# Patient Record
Sex: MALE | Race: WHITE | NOT HISPANIC OR LATINO | Employment: STUDENT | URBAN - METROPOLITAN AREA
[De-identification: names, ages, dates, MRNs, and addresses within clinical notes are randomized per-mention and may not be internally consistent; named-entity substitution may affect disease eponyms.]

---

## 2017-10-11 ENCOUNTER — CLINICAL SUPPORT (OUTPATIENT)
Dept: OCCUPATIONAL MEDICINE | Facility: CLINIC | Age: 22
End: 2017-10-11

## 2017-10-11 DIAGNOSIS — Z11.1 PPD SCREENING TEST: Primary | ICD-10-CM

## 2017-10-11 PROCEDURE — 86580 TB INTRADERMAL TEST: CPT | Mod: S$GLB,,,

## 2017-11-25 ENCOUNTER — IMMUNIZATION (OUTPATIENT)
Dept: URGENT CARE | Facility: CLINIC | Age: 22
End: 2017-11-25

## 2017-11-25 PROCEDURE — 90686 IIV4 VACC NO PRSV 0.5 ML IM: CPT | Mod: S$GLB,,, | Performed by: INTERNAL MEDICINE

## 2023-07-07 ENCOUNTER — OFFICE VISIT (OUTPATIENT)
Dept: URGENT CARE | Facility: CLINIC | Age: 28
End: 2023-07-07
Payer: MEDICAID

## 2023-07-07 VITALS
TEMPERATURE: 99 F | OXYGEN SATURATION: 99 % | RESPIRATION RATE: 20 BRPM | HEIGHT: 70 IN | WEIGHT: 262 LBS | HEART RATE: 98 BPM | BODY MASS INDEX: 37.51 KG/M2 | DIASTOLIC BLOOD PRESSURE: 98 MMHG | SYSTOLIC BLOOD PRESSURE: 170 MMHG

## 2023-07-07 DIAGNOSIS — H60.332 ACUTE SWIMMER'S EAR OF LEFT SIDE: ICD-10-CM

## 2023-07-07 DIAGNOSIS — H61.23 IMPACTED CERUMEN OF BOTH EARS: ICD-10-CM

## 2023-07-07 DIAGNOSIS — H92.03 OTALGIA, BILATERAL: Primary | ICD-10-CM

## 2023-07-07 PROCEDURE — 99203 PR OFFICE/OUTPT VISIT, NEW, LEVL III, 30-44 MIN: ICD-10-PCS | Mod: S$GLB,,, | Performed by: FAMILY MEDICINE

## 2023-07-07 PROCEDURE — 99203 OFFICE O/P NEW LOW 30 MIN: CPT | Mod: S$GLB,,, | Performed by: FAMILY MEDICINE

## 2023-07-07 RX ORDER — ATORVASTATIN CALCIUM 20 MG/1
1 TABLET, FILM COATED ORAL DAILY
COMMUNITY
Start: 2023-06-10 | End: 2023-07-10

## 2023-07-07 RX ORDER — AMLODIPINE BESYLATE 5 MG/1
5 TABLET ORAL
COMMUNITY
Start: 2023-06-09

## 2023-07-07 RX ORDER — NEOMYCIN SULFATE, POLYMYXIN B SULFATE, HYDROCORTISONE 3.5; 10000; 1 MG/ML; [USP'U]/ML; MG/ML
3 SOLUTION/ DROPS AURICULAR (OTIC) 3 TIMES DAILY
Qty: 10 ML | Refills: 0 | Status: SHIPPED | OUTPATIENT
Start: 2023-07-07 | End: 2023-07-17

## 2023-07-07 RX ORDER — FLUOXETINE HYDROCHLORIDE 20 MG/1
1 CAPSULE ORAL DAILY
COMMUNITY
Start: 2023-06-28

## 2023-07-07 NOTE — PROGRESS NOTES
"Subjective:      Patient ID: Ovidio Montero Jr. is a 27 y.o. male.    Vitals:  height is 5' 10" (1.778 m) and weight is 118.8 kg (262 lb). His temperature is 98.6 °F (37 °C). His blood pressure is 170/98 (abnormal) and his pulse is 98. His respiration is 20 and oxygen saturation is 99%.     Chief Complaint: Otalgia    27 year old male presents today with an earache, AU but more AD. Symptom started about a week ago. Treatments at home includes Neomycin ear drops and Augmentin with no relief  Right ear somewhat improved, left ear is worse  Minimal sinus congestion      Otalgia   There is pain in both ears. This is a new problem. The current episode started in the past 7 days. The problem has been gradually worsening. There has been no fever. The pain is at a severity of 7/10. Pertinent negatives include no abdominal pain, coughing, diarrhea, ear discharge, headaches, hearing loss, neck pain, rash, rhinorrhea, sore throat or vomiting. He has tried ear drops for the symptoms.     HENT:  Positive for ear pain. Negative for ear discharge, hearing loss and sore throat.    Neck: Negative for neck pain.   Respiratory:  Negative for cough.    Gastrointestinal:  Negative for abdominal pain, vomiting and diarrhea.   Skin:  Negative for rash.   Neurological:  Negative for headaches.    Objective:     Physical Exam   Constitutional: He is oriented to person, place, and time. He appears well-developed. He is cooperative.   HENT:   Head: Normocephalic and atraumatic.   Ears:   Right Ear: Hearing, external ear and ear canal normal.   Left Ear: Hearing, external ear and ear canal normal.      Comments: Tms not visible, wax in both ear canal  Left ear canal inflamed and swollen  Tender tragus    Nose: Nose normal. No mucosal edema or nasal deformity. No epistaxis. Right sinus exhibits no maxillary sinus tenderness and no frontal sinus tenderness. Left sinus exhibits no maxillary sinus tenderness and no frontal sinus tenderness. "   Mouth/Throat: Uvula is midline, oropharynx is clear and moist and mucous membranes are normal. Mucous membranes are moist. No trismus in the jaw. Normal dentition. No uvula swelling. No oropharyngeal exudate or posterior oropharyngeal erythema. Oropharynx is clear.   Eyes: Conjunctivae and lids are normal.   Neck: Trachea normal and phonation normal. Neck supple.   Cardiovascular: Normal rate, regular rhythm, normal heart sounds and normal pulses.   Pulmonary/Chest: Effort normal and breath sounds normal.   Abdominal: Normal appearance and bowel sounds are normal. Soft.   Musculoskeletal: Normal range of motion.         General: Normal range of motion.   Neurological: He is alert and oriented to person, place, and time. He exhibits normal muscle tone.   Skin: Skin is warm, dry and intact.   Psychiatric: His speech is normal and behavior is normal. Judgment and thought content normal.   Nursing note and vitals reviewed.    Assessment:     1. Otalgia, bilateral    2. Impacted cerumen of both ears        Plan:       Otalgia, bilateral    Impacted cerumen of both ears         Irrigated and cleansed right ear canal  Left ear canal partially disimpacted,

## 2024-04-17 ENCOUNTER — OFFICE VISIT (OUTPATIENT)
Dept: URGENT CARE | Facility: CLINIC | Age: 29
End: 2024-04-17
Payer: COMMERCIAL

## 2024-04-17 ENCOUNTER — OCCUPATIONAL HEALTH (OUTPATIENT)
Dept: URGENT CARE | Facility: CLINIC | Age: 29
End: 2024-04-17
Payer: COMMERCIAL

## 2024-04-17 VITALS
WEIGHT: 262 LBS | SYSTOLIC BLOOD PRESSURE: 148 MMHG | TEMPERATURE: 98 F | BODY MASS INDEX: 37.51 KG/M2 | HEIGHT: 70 IN | RESPIRATION RATE: 17 BRPM | OXYGEN SATURATION: 97 % | DIASTOLIC BLOOD PRESSURE: 87 MMHG | HEART RATE: 95 BPM

## 2024-04-17 DIAGNOSIS — Z02.6 ENCOUNTER RELATED TO WORKER'S COMPENSATION CLAIM: ICD-10-CM

## 2024-04-17 DIAGNOSIS — Z02.83 ENCOUNTER FOR DRUG SCREENING: Primary | ICD-10-CM

## 2024-04-17 DIAGNOSIS — S86.911A STRAIN OF RIGHT KNEE, INITIAL ENCOUNTER: Primary | ICD-10-CM

## 2024-04-17 LAB
CTP QC/QA: YES
POC 10 PANEL DRUG SCREEN: NEGATIVE

## 2024-04-17 PROCEDURE — 80305 DRUG TEST PRSMV DIR OPT OBS: CPT | Mod: S$GLB,,, | Performed by: SURGERY

## 2024-04-17 PROCEDURE — 73562 X-RAY EXAM OF KNEE 3: CPT | Mod: 50,FY,S$GLB, | Performed by: RADIOLOGY

## 2024-04-17 PROCEDURE — 99203 OFFICE O/P NEW LOW 30 MIN: CPT | Mod: S$GLB,,, | Performed by: SURGERY

## 2024-04-17 RX ORDER — DICLOFENAC SODIUM 10 MG/G
2 GEL TOPICAL 4 TIMES DAILY
Qty: 200 G | Refills: 2 | OUTPATIENT
Start: 2024-04-17 | End: 2024-04-17

## 2024-04-17 RX ORDER — NAPROXEN 500 MG/1
500 TABLET ORAL 2 TIMES DAILY
Qty: 60 TABLET | Refills: 0 | Status: SHIPPED | OUTPATIENT
Start: 2024-04-17 | End: 2024-05-17

## 2024-04-17 RX ORDER — DICLOFENAC SODIUM 10 MG/G
2 GEL TOPICAL 4 TIMES DAILY
Qty: 200 G | Refills: 2 | OUTPATIENT
Start: 2024-04-17 | End: 2024-04-17 | Stop reason: SDUPTHER

## 2024-04-17 RX ORDER — DICLOFENAC SODIUM 10 MG/G
2 GEL TOPICAL 4 TIMES DAILY
Qty: 200 G | Refills: 2 | Status: SHIPPED | OUTPATIENT
Start: 2024-04-17
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
## 2024-04-24 ENCOUNTER — TELEPHONE (OUTPATIENT)
Dept: URGENT CARE | Facility: CLINIC | Age: 29
End: 2024-04-24
Payer: MEDICAID

## 2024-04-24 NOTE — TELEPHONE ENCOUNTER
Called the patient in reference to his [facility] appointment that was scheduled for today 4/24/24, but not showing on his OVMR that's given to the patients after there visits and the patient did not answer the phone after multiple rings.  AFG

## 2024-05-12 ENCOUNTER — HOSPITAL ENCOUNTER (EMERGENCY)
Facility: HOSPITAL | Age: 29
Discharge: HOME | End: 2024-05-12
Attending: EMERGENCY MEDICINE
Payer: MEDICAID

## 2024-05-12 VITALS
OXYGEN SATURATION: 96 % | WEIGHT: 315 LBS | SYSTOLIC BLOOD PRESSURE: 165 MMHG | DIASTOLIC BLOOD PRESSURE: 84 MMHG | BODY MASS INDEX: 45.2 KG/M2 | TEMPERATURE: 98 F | RESPIRATION RATE: 18 BRPM | HEART RATE: 97 BPM

## 2024-05-12 DIAGNOSIS — R07.9 CHEST PAIN: ICD-10-CM

## 2024-05-12 DIAGNOSIS — R53.83 FATIGUE, UNSPECIFIED TYPE: ICD-10-CM

## 2024-05-12 DIAGNOSIS — R07.89 CHEST WALL PAIN: Primary | ICD-10-CM

## 2024-05-12 DIAGNOSIS — R63.1 POLYDIPSIA: ICD-10-CM

## 2024-05-12 LAB
ALBUMIN SERPL BCP-MCNC: 3.7 G/DL (ref 3.5–5.2)
ALP SERPL-CCNC: 49 U/L (ref 55–135)
ALT SERPL W/O P-5'-P-CCNC: 56 U/L (ref 10–44)
ANION GAP SERPL CALC-SCNC: 14 MMOL/L (ref 8–16)
AST SERPL-CCNC: 33 U/L (ref 10–40)
BACTERIA #/AREA URNS HPF: NORMAL /HPF
BASOPHILS # BLD AUTO: 0.03 K/UL (ref 0–0.2)
BASOPHILS NFR BLD: 0.4 % (ref 0–1.9)
BILIRUB SERPL-MCNC: 0.2 MG/DL (ref 0.1–1)
BILIRUB UR QL STRIP: NEGATIVE
BUN SERPL-MCNC: 17 MG/DL (ref 6–20)
CALCIUM SERPL-MCNC: 9.6 MG/DL (ref 8.7–10.5)
CHLORIDE SERPL-SCNC: 103 MMOL/L (ref 95–110)
CLARITY UR: CLEAR
CO2 SERPL-SCNC: 24 MMOL/L (ref 23–29)
COLOR UR: YELLOW
CREAT SERPL-MCNC: 0.8 MG/DL (ref 0.5–1.4)
DIFFERENTIAL METHOD BLD: ABNORMAL
EOSINOPHIL # BLD AUTO: 0.2 K/UL (ref 0–0.5)
EOSINOPHIL NFR BLD: 2.8 % (ref 0–8)
ERYTHROCYTE [DISTWIDTH] IN BLOOD BY AUTOMATED COUNT: 14.1 % (ref 11.5–14.5)
EST. GFR  (NO RACE VARIABLE): >60 ML/MIN/1.73 M^2
GLUCOSE SERPL-MCNC: 84 MG/DL (ref 70–110)
GLUCOSE UR QL STRIP: ABNORMAL
HCT VFR BLD AUTO: 43.2 % (ref 40–54)
HGB BLD-MCNC: 13.9 G/DL (ref 14–18)
HGB UR QL STRIP: ABNORMAL
HYALINE CASTS #/AREA URNS LPF: 0 /LPF
IMM GRANULOCYTES # BLD AUTO: 0.04 K/UL (ref 0–0.04)
IMM GRANULOCYTES NFR BLD AUTO: 0.5 % (ref 0–0.5)
KETONES UR QL STRIP: NEGATIVE
LEUKOCYTE ESTERASE UR QL STRIP: NEGATIVE
LYMPHOCYTES # BLD AUTO: 2.7 K/UL (ref 1–4.8)
LYMPHOCYTES NFR BLD: 32.3 % (ref 18–48)
MCH RBC QN AUTO: 27.4 PG (ref 27–31)
MCHC RBC AUTO-ENTMCNC: 32.2 G/DL (ref 32–36)
MCV RBC AUTO: 85 FL (ref 82–98)
MICROSCOPIC COMMENT: NORMAL
MONOCYTES # BLD AUTO: 0.8 K/UL (ref 0.3–1)
MONOCYTES NFR BLD: 9.2 % (ref 4–15)
NEUTROPHILS # BLD AUTO: 4.6 K/UL (ref 1.8–7.7)
NEUTROPHILS NFR BLD: 54.8 % (ref 38–73)
NITRITE UR QL STRIP: NEGATIVE
NRBC BLD-RTO: 0 /100 WBC
PH UR STRIP: 6 [PH] (ref 5–8)
PLATELET # BLD AUTO: 283 K/UL (ref 150–450)
PMV BLD AUTO: 10 FL (ref 9.2–12.9)
POCT GLUCOSE: 100 MG/DL (ref 70–110)
POTASSIUM SERPL-SCNC: 4.1 MMOL/L (ref 3.5–5.1)
PROT SERPL-MCNC: 7.5 G/DL (ref 6–8.4)
PROT UR QL STRIP: ABNORMAL
RBC # BLD AUTO: 5.08 M/UL (ref 4.6–6.2)
RBC #/AREA URNS HPF: 1 /HPF (ref 0–4)
SODIUM SERPL-SCNC: 141 MMOL/L (ref 136–145)
SP GR UR STRIP: 1.02 (ref 1–1.03)
TROPONIN I SERPL DL<=0.01 NG/ML-MCNC: <0.006 NG/ML (ref 0–0.03)
TSH SERPL DL<=0.005 MIU/L-ACNC: 1.77 UIU/ML (ref 0.4–4)
URN SPEC COLLECT METH UR: ABNORMAL
UROBILINOGEN UR STRIP-ACNC: NEGATIVE EU/DL
WBC # BLD AUTO: 8.3 K/UL (ref 3.9–12.7)
WBC #/AREA URNS HPF: 1 /HPF (ref 0–5)

## 2024-05-12 PROCEDURE — 94761 N-INVAS EAR/PLS OXIMETRY MLT: CPT

## 2024-05-12 PROCEDURE — 99284 EMERGENCY DEPT VISIT MOD MDM: CPT | Mod: 25

## 2024-05-12 PROCEDURE — 85025 COMPLETE CBC W/AUTO DIFF WBC: CPT | Performed by: NURSE PRACTITIONER

## 2024-05-12 PROCEDURE — 93010 ELECTROCARDIOGRAM REPORT: CPT | Mod: ,,, | Performed by: INTERNAL MEDICINE

## 2024-05-12 PROCEDURE — 84443 ASSAY THYROID STIM HORMONE: CPT

## 2024-05-12 PROCEDURE — 80053 COMPREHEN METABOLIC PANEL: CPT | Performed by: NURSE PRACTITIONER

## 2024-05-12 PROCEDURE — 84484 ASSAY OF TROPONIN QUANT: CPT

## 2024-05-12 PROCEDURE — 82962 GLUCOSE BLOOD TEST: CPT

## 2024-05-12 PROCEDURE — 81000 URINALYSIS NONAUTO W/SCOPE: CPT | Performed by: NURSE PRACTITIONER

## 2024-05-12 PROCEDURE — 93005 ELECTROCARDIOGRAM TRACING: CPT

## 2024-05-12 NOTE — FIRST PROVIDER EVALUATION
Emergency Department TeleTriage Encounter Note      CHIEF COMPLAINT    Chief Complaint   Patient presents with    Chest Pain     Fatigue that started Wednesday with increased appetite and thirst. Chest pain that started yesterday.       VITAL SIGNS   Initial Vitals [05/12/24 1541]   BP Pulse Resp Temp SpO2   (!) 176/98 108 18 97.8 °F (36.6 °C) 95 %      MAP       --            ALLERGIES    Review of patient's allergies indicates:   Allergen Reactions    Penicillins        PROVIDER TRIAGE NOTE  Verbal consent for the teletriage evaluation was given by the patient at the start of the evaluation.  All efforts will be made to maintain patient's privacy during the evaluation.      This is a teletriage evaluation of a 28 y.o. male presenting to the ED with c/o chest pain, fatigue, increase in thirst and appetite that started 5 days ago. Limited physical exam via telehealth: The patient is awake, alert, answering questions appropriately and is not in respiratory distress.  As the Teletriage provider, I performed an initial assessment and ordered appropriate labs and imaging studies, if any, to facilitate the patient's care once placed in the ED. Once a room is available, care and a full evaluation will be completed by an alternate ED provider.  Any additional orders and the final disposition will be determined by that provider.  All imaging and labs will not be followed-up by the Teletriage Team, including myself.          ORDERS  Labs Reviewed   POCT GLUCOSE   POCT GLUCOSE MONITORING CONTINUOUS       ED Orders (720h ago, onward)      Start Ordered     Status Ordering Provider    05/12/24 1556 05/12/24 1555  CBC auto differential  STAT         Ordered ZOHREH VILLEGAS    05/12/24 1556 05/12/24 1555  Comprehensive metabolic panel  STAT         Ordered ZOHREH VILLEGAS    05/12/24 1556 05/12/24 1555  X-Ray Chest PA And Lateral  1 time imaging         Ordered ZOHREH VILLEGAS    05/12/24 1556 05/12/24 1555  Urinalysis, Reflex to  Urine Culture Urine, Clean Catch  STAT         Ordered ZOHREH VILLEGAS    05/12/24 1555 05/12/24 1555  Saline lock IV  Once         Ordered ZOHREH VILLEGAS    05/12/24 1555 05/12/24 1555  Pulse Oximetry Continuous  Continuous         Ordered ZOHREH VILLEGAS    05/12/24 1555 05/12/24 1555  Cardiac Monitoring - Adult  Continuous        Comments: Notify Physician If:    Ordered ZOHREH VILLEGAS    05/12/24 1555 05/12/24 1555  EKG 12-lead  Once         Ordered ZOHREH VILLEGAS    05/12/24 1547 05/12/24 1547  POCT glucose  Once         Final result EMERGENCY, DEPT PHYSICIAN    05/12/24 1546 05/12/24 1545  POCT glucose  Once         Acknowledged TYLER NARANJO    05/12/24 1544 05/12/24 1543  EKG 12-lead (Chest Pain) Age >30  Once        Comments: If patient > 30 yrs.    Completed by BEKA SEQUEIRA on 5/12/2024 at  3:44 PM TYLER NARANJO              Virtual Visit Note: The provider triage portion of this emergency department evaluation and documentation was performed via [employer], a HIPAA-compliant telemedicine application, in concert with a tele-presenter in the room. A face to face patient evaluation with one of my colleagues will occur once the patient is placed in an emergency department room.      DISCLAIMER: This note was prepared with [email or web address] voice recognition transcription software. Garbled syntax, mangled pronouns, and other bizarre constructions may be attributed to that software system.

## 2024-05-13 LAB
OHS QRS DURATION: 100 MS
OHS QTC CALCULATION: 454 MS

## 2024-05-13 NOTE — ED PROVIDER NOTES
"Encounter Date: 5/12/2024       History     Chief Complaint   Patient presents with    Chest Pain     Fatigue that started Wednesday with increased appetite and thirst. Chest pain that started yesterday.     Patient is a 28-year-old male with ADHD, anxiety, hypertension, and hyperlipidemia who presents to emergency room for left-sided chest pain, fatigue, and increased thirst that onset 4 days ago.  Patient states that he has been feeling increasingly fatigued, sleeping more often than usual.  Yesterday he started to have intermittent left-sided chest pain that felt like a pressure sensation.  States that "if he presses on it, it feels a little better." No exacerbating factors.  Denies nausea, vomiting, abdominal pain, weakness, numbness, tingling, fever, cough, sore throat, congestion, headache, visual changes, or others at this time.  No treatment attempted prior to arrival.    The history is provided by the patient. No  was used.     Review of patient's allergies indicates:   Allergen Reactions    Penicillins      Past Medical History:   Diagnosis Date    Acquired acanthosis nigricans     ADHD (attention deficit hyperactivity disorder)     Dr. Champagne    Anxiety     HTN (hypertension)     Hyperlipidemia, mixed     Hypertension     Insulin resistance     Morbid obesity     Seasonal allergic rhinitis     Sleep disturbance     Dr. Champagne, counseling    Tic disorder     Vision abnormalities     Myopia & Astigmatism     Past Surgical History:   Procedure Laterality Date    CIRCUMCISION, PRIMARY       Family History   Problem Relation Name Age of Onset    Obesity Mother Ena     Anxiety disorder Mother Ena     Depression Father      Mental illness Father      Depression Paternal Aunt x1     Mental illness Paternal Aunt x1     Depression Maternal Grandmother      Bipolar disorder Maternal Grandmother      Leukemia Maternal Grandfather          Adult Leukemia - in Remission    Depression Paternal " Grandfather      Suicidality Paternal Grandfather      Early death Paternal Grandfather      Cancer Other Mat DZ     Depression Other Mat DZ     Bipolar disorder Other Mat DZ     Mental illness Other Mat DZ     Stroke Other Mat DZ     Hypertension Other Mat DZ     Depression Other Pat DZ     Bipolar disorder Other Pat DZ     Mental illness Other Pat DZ      Social History     Tobacco Use    Smoking status: Passive Smoke Exposure - Never Smoker    Smokeless tobacco: Never   Substance Use Topics    Alcohol use: No    Drug use: No     Review of Systems   Constitutional:  Positive for fatigue. Negative for chills, diaphoresis and fever.   HENT:  Negative for congestion, sore throat and trouble swallowing.    Respiratory:  Negative for cough and shortness of breath.    Cardiovascular:  Positive for chest pain (left-sided). Negative for palpitations.   Gastrointestinal:  Negative for abdominal pain, blood in stool, constipation, diarrhea, nausea and vomiting.   Endocrine: Positive for polydipsia and polyphagia.   Genitourinary:  Negative for difficulty urinating, dysuria, frequency and hematuria.   Musculoskeletal:  Negative for back pain and myalgias.   Skin:  Negative for rash and wound.   Neurological:  Negative for weakness, light-headedness, numbness and headaches.       Physical Exam     Initial Vitals [05/12/24 1541]   BP Pulse Resp Temp SpO2   (!) 176/98 108 18 97.8 °F (36.6 °C) 95 %      MAP       --         Physical Exam    Nursing note and vitals reviewed.  Constitutional: He appears well-developed. He is not diaphoretic. He is Obese . No distress.   Patient well-appearing.  Awake and alert.  No acute distress.  Maintaining airway appropriately.  Speaking in complete sentences.   HENT:   Head: Normocephalic and atraumatic.   Right Ear: External ear normal.   Left Ear: External ear normal.   Mouth/Throat: Oropharynx is clear and moist and mucous membranes are normal.   Eyes: Conjunctivae and EOM are normal.  Pupils are equal, round, and reactive to light. No scleral icterus.   Neck: Neck supple.   Normal range of motion.  Cardiovascular:  Normal rate, regular rhythm and normal heart sounds.     Exam reveals no friction rub.       No murmur heard.  Pulmonary/Chest: Breath sounds normal. No respiratory distress. He has no wheezes. He has no rhonchi. He has no rales. He exhibits no tenderness.   Abdominal: Abdomen is soft. Bowel sounds are normal. He exhibits no distension. There is no abdominal tenderness. There is no rebound and no guarding.   Musculoskeletal:         General: No tenderness or edema. Normal range of motion.      Cervical back: Normal range of motion and neck supple.     Neurological: He is alert and oriented to person, place, and time. He has normal strength.   Skin: Skin is warm and dry. No erythema.   Psychiatric: He has a normal mood and affect. His behavior is normal. Thought content normal.         ED Course   Procedures  Labs Reviewed   CBC W/ AUTO DIFFERENTIAL - Abnormal; Notable for the following components:       Result Value    Hemoglobin 13.9 (*)     All other components within normal limits   COMPREHENSIVE METABOLIC PANEL - Abnormal; Notable for the following components:    Alkaline Phosphatase 49 (*)     ALT 56 (*)     All other components within normal limits   URINALYSIS, REFLEX TO URINE CULTURE - Abnormal; Notable for the following components:    Protein, UA 3+ (*)     Glucose, UA 1+ (*)     Occult Blood UA Trace (*)     All other components within normal limits    Narrative:     Specimen Source->Urine   URINALYSIS MICROSCOPIC    Narrative:     Specimen Source->Urine   TROPONIN I   TSH   POCT GLUCOSE   POCT GLUCOSE MONITORING CONTINUOUS          Imaging Results              X-Ray Chest PA And Lateral (Final result)  Result time 05/12/24 16:49:32      Final result by Allan Garcia MD (05/12/24 16:49:32)                   Impression:      No acute intrathoracic  abnormality.      Electronically signed by: Allan Garcia  Date:    05/12/2024  Time:    16:49               Narrative:    EXAMINATION:  XR CHEST PA AND LATERAL    CLINICAL HISTORY:  Chest Pain;    TECHNIQUE:  PA and lateral views of the chest were performed.    COMPARISON:  None    FINDINGS:  The lungs are clear, with normal appearance of pulmonary vasculature and no pleural effusion or pneumothorax.    The cardiac silhouette is normal in size. The hilar and mediastinal contours are unremarkable.    Bones are intact.                                       Medications - No data to display  Medical Decision Making  Patient presents to emergency room for chest pain.  Blood pressure 176/98.  Vital signs otherwise stable.  Physical exam as stated above.    Differential Diagnosis includes, but is not limited to ACS/MI, PE, aortic dissection, pneumothorax, cardiac tamponade, pericarditis/myocarditis, pneumonia, infection/abscess, lung mass, trauma/fracture, costochondritis/pleurisy, MSK pain/contusion, GERD, biliary disease, pancreatitis, or anemia. O2 saturation greater than 95%.  No shortness of breath.  No risk factors that would suggest pulmonary embolism.  Chest x-ray without mediastinal widening.  Unlikely dissection.  Chest x-ray without signs of pneumothorax, pneumonia, or mass.  No cardiomegaly.  I do not suspect pericarditis/myocarditis.  Lab work relatively unremarkable.  No anemia or electrolyte abnormalities.  TSH within normal limits.  Glucose within normal limits.  EKG without ST elevations or arrhythmia.  Troponin within normal limits.  Clinical presentation and physical exam most suggestive of GERD versus MSK pain.  Polydipsia may be due to diabetes insipidus.  Referral placed to family medicine for additional workup and management.    I see no indication of an emergent process beyond that addressed during our encounter. Patient stable for discharge at this time. I have counseled the patient regarding follow  up with PCP and gave strict return precautions. I have discussed the final diagnosis and gave instructions regarding home medications. Patient verbalized understanding and is agreeable.     Problems Addressed:  Chest pain: acute illness or injury  Chest wall pain: acute illness or injury  Fatigue, unspecified type: acute illness or injury  Polydipsia: acute illness or injury    Amount and/or Complexity of Data Reviewed  External Data Reviewed: notes.     Details: Patient currently sees Dr. Bahena with [facility] for primary care.  Patient has ER visits for hypertensive crisis in 06/2023.  Was in ICU at that time.  Labs: ordered. Decision-making details documented in ED Course.  Radiology: ordered. Decision-making details documented in ED Course.  ECG/medicine tests: ordered. Decision-making details documented in ED Course.    Risk  Risk Details: Comorbidities taken into consideration during the patient's evaluation and treatment include ADHD, anxiety, hypertension, and hyperlipidemia.  Social determinants of health taken into consideration during development of our treatment plan include difficulty in obtaining follow-up, obtaining medications, health literacy, access to healthy options for preventative/conservative management, and/or support systems due to, but not limited to, transportation limitations, socioeconomic status, and environmental factors.                ED Course as of 05/12/24 2026   Sun May 12, 2024   1809 Urinalysis, Reflex to Urine Culture Urine, Clean Catch(!)  Urinalysis with trace blood.  1+ glucose.  No leukocytes. [BJ]   1809 Urinalysis Microscopic  Microscopic urinalysis without bacteria. [BJ]   1809 POCT glucose  Glucose of 100. [BJ]   1809 X-Ray Chest PA And Lateral  Independent interpretation of chest x-ray without effusion, consolidation, or pneumothorax.  Trachea midline.  Agree with radiology reading. [BJ]   1841 CBC auto differential(!)  CBC relatively unremarkable.  No increase in white  blood cells.  H/H stable.  Platelet count within normal limits. [BJ]   1911 Troponin I  Troponin within normal limits. [BJ]   1911 Comprehensive metabolic panel(!)  CMP relatively unremarkable.  Electrolytes within normal limits.  BUN and creatinine within normal limits.  Glucose within normal limits.  No anion gap.  ALP slightly decreased to 49 and ALT slightly increased to 56. [BJ]   1911 EKG 12-lead (Chest Pain) Age >30  Independent interpretation of EKG with sinus tachycardia at 111 beats per minute.  No ST elevations.  No inversions.  No obvious arrhythmias. [BJ]   1920 TSH  TSH within normal limits. [BJ]      ED Course User Index  [BJ] Brenna Frazier PA-C                           Clinical Impression:  Final diagnoses:  [R07.9] Chest pain  [R07.89] Chest wall pain (Primary)  [R53.83] Fatigue, unspecified type  [R63.1] Polydipsia          ED Disposition Condition    Discharge Stable          ED Prescriptions    None       Follow-up Information       Follow up With Specialties Details Why Contact Info Additional Information    Jose Pierre MD Internal Medicine   [address]  [address]  [place] [address]  [phone]       [facility] Family [facility]   [address], [address]  [place] [address] [phone] Please park in [address] or [address] and use [address]. Take Medical Office Bldg. elevators.            This note was partially created using [employer] Voice Recognition software. Typographical and content errors may occur with this process. While efforts are made to detect and correct such errors, in some cases errors will persist. For this reason, wording in this document should be considered in the proper context and not strictly verbatim.        Brenna Frazier PA-C  05/12/24 2026

## 2024-05-13 NOTE — DISCHARGE INSTRUCTIONS
Thank you for allowing me and my emergency team to take care of you here today! I hope you feel better soon. Please do not hesitate to return with any additional concerns that may arise from this or any new problem you encounter.    Our goal in the emergency department is to always give you outstanding care and exceptional service. If you receive a survey by mail or e-mail in the next week regarding your experience in our ED, we would greatly appreciate you completing it. Your feedback provides us with a way to recognize our staff who give very good care and it helps us learn how to improve when your experience was below the excellence we aspire to be!    Brook Juneau, PA-C [facility]   Emergency Room Physician Assistant

## 2024-05-13 NOTE — ED NOTES
Assumed care of patient. Patient resting comfortably in bed. Side rails up and call light within reach. Patient has no complaints at this time.

## 2024-06-24 ENCOUNTER — HOSPITAL ENCOUNTER (EMERGENCY)
Facility: HOSPITAL | Age: 29
Discharge: HOME | End: 2024-06-24
Attending: EMERGENCY MEDICINE
Payer: COMMERCIAL

## 2024-06-24 VITALS
RESPIRATION RATE: 79 BRPM | OXYGEN SATURATION: 99 % | HEART RATE: 99 BPM | HEIGHT: 70 IN | WEIGHT: 315 LBS | DIASTOLIC BLOOD PRESSURE: 98 MMHG | SYSTOLIC BLOOD PRESSURE: 207 MMHG | BODY MASS INDEX: 45.1 KG/M2 | TEMPERATURE: 98 F

## 2024-06-24 DIAGNOSIS — S99.919A ANKLE INJURY: ICD-10-CM

## 2024-06-24 DIAGNOSIS — S93.402A SPRAIN OF LEFT ANKLE, UNSPECIFIED LIGAMENT, INITIAL ENCOUNTER: Primary | ICD-10-CM

## 2024-06-24 PROCEDURE — 25000003 PHARM REV CODE 250: Performed by: NURSE PRACTITIONER

## 2024-06-24 PROCEDURE — 99283 EMERGENCY DEPT VISIT LOW MDM: CPT | Mod: 25

## 2024-06-24 RX ORDER — IBUPROFEN 800 MG/1
800 TABLET ORAL EVERY 6 HOURS PRN
Qty: 20 TABLET | Refills: 0 | Status: SHIPPED | OUTPATIENT
Start: 2024-06-24

## 2024-06-24 RX ORDER — IBUPROFEN 400 MG/1
800 TABLET ORAL
Status: COMPLETED | OUTPATIENT
Start: 2024-06-24 | End: 2024-06-24

## 2024-06-24 RX ORDER — AMLODIPINE BESYLATE 5 MG/1
5 TABLET ORAL
Status: COMPLETED | OUTPATIENT
Start: 2024-06-24 | End: 2024-06-24

## 2024-06-24 RX ADMIN — IBUPROFEN 800 MG: 400 TABLET ORAL at 09:06

## 2024-06-24 RX ADMIN — AMLODIPINE BESYLATE 5 MG: 5 TABLET ORAL at 09:06

## 2024-06-24 NOTE — Clinical Note
"Ovidio Parkon" Winston was seen and treated in our emergency department on 6/24/2024.  He may return to work on 06/26/2024.       If you have any questions or concerns, please don't hesitate to call.      Lia Sosa NP"

## 2024-06-24 NOTE — DISCHARGE INSTRUCTIONS

## 2024-06-25 ENCOUNTER — OFFICE VISIT (OUTPATIENT)
Dept: URGENT CARE | Facility: CLINIC | Age: 29
End: 2024-06-25
Payer: COMMERCIAL

## 2024-06-25 VITALS
HEART RATE: 98 BPM | DIASTOLIC BLOOD PRESSURE: 123 MMHG | HEIGHT: 70 IN | OXYGEN SATURATION: 97 % | BODY MASS INDEX: 45.1 KG/M2 | RESPIRATION RATE: 18 BRPM | SYSTOLIC BLOOD PRESSURE: 184 MMHG | WEIGHT: 315 LBS | TEMPERATURE: 98 F

## 2024-06-25 DIAGNOSIS — Z02.6 ENCOUNTER RELATED TO WORKER'S COMPENSATION CLAIM: ICD-10-CM

## 2024-06-25 DIAGNOSIS — S86.911D STRAIN OF RIGHT KNEE, SUBSEQUENT ENCOUNTER: Primary | ICD-10-CM

## 2024-06-25 PROCEDURE — 99213 OFFICE O/P EST LOW 20 MIN: CPT | Mod: S$GLB,,, | Performed by: SURGERY

## 2024-06-25 NOTE — ED PROVIDER NOTES
Encounter Date: 6/24/2024       History     Chief Complaint   Patient presents with    Ankle Pain     Left ankle injury a few days ago and currently with pain without relief from ibuprofen this AM.     28-year-old male presents emergency room with reports of left ankle pain that occurred on Friday while at work.  Patient states over the weekend he was able to stay off of the ankle in which it did not hurt until he came back to work today.  He reports pain with weight-bearing.  Reports pain is more to the lateral aspect of the ankle.  Patient has a past medical history of ADHD, anxiety, hypertension, hyperlipidemia, insulin resistance, morbid obesity, allergic rhinitis, sleep apnea, tic disorder, myopia.    The history is provided by the patient. No  was used.     Review of patient's allergies indicates:   Allergen Reactions    Penicillins      Past Medical History:   Diagnosis Date    Acquired acanthosis nigricans     ADHD (attention deficit hyperactivity disorder)     Dr. Champagne    Anxiety     HTN (hypertension)     Hyperlipidemia, mixed     Hypertension     Insulin resistance     Morbid obesity     Seasonal allergic rhinitis     Sleep disturbance     Dr. Champagne, counseling    Tic disorder     Vision abnormalities     Myopia & Astigmatism     Past Surgical History:   Procedure Laterality Date    CIRCUMCISION, PRIMARY       Family History   Problem Relation Name Age of Onset    Obesity Mother Ena     Anxiety disorder Mother Ena     Depression Father      Mental illness Father      Depression Paternal Aunt x1     Mental illness Paternal Aunt x1     Depression Maternal Grandmother      Bipolar disorder Maternal Grandmother      Leukemia Maternal Grandfather          Adult Leukemia - in Remission    Depression Paternal Grandfather      Suicidality Paternal Grandfather      Early death Paternal Grandfather      Cancer Other Mat DZ     Depression Other Mat DZ     Bipolar disorder Other Mat DZ      Mental illness Other Mat DZ     Stroke Other Mat DZ     Hypertension Other Mat DZ     Depression Other Pat DZ     Bipolar disorder Other Pat DZ     Mental illness Other Pat DZ      Social History     Tobacco Use    Smoking status: Passive Smoke Exposure - Never Smoker    Smokeless tobacco: Never   Substance Use Topics    Alcohol use: No    Drug use: No     Review of Systems   Musculoskeletal:         Ankle pain and injury   All other systems reviewed and are negative.      Physical Exam     Initial Vitals [06/24/24 0826]   BP Pulse Resp Temp SpO2   (!) 207/98 99 (!) 79 98.4 °F (36.9 °C) 99 %      MAP       --         Physical Exam    Constitutional: He appears well-developed and well-nourished. He is not diaphoretic. No distress.   HENT:   Head: Normocephalic and atraumatic.   Right Ear: Hearing and tympanic membrane normal.   Left Ear: Hearing and tympanic membrane normal.   Nose: Nose normal.   Mouth/Throat: Uvula is midline, oropharynx is clear and moist and mucous membranes are normal.   Eyes: Lids are normal. Pupils are equal, round, and reactive to light.   Cardiovascular:  Normal rate.           Pulmonary/Chest: Effort normal and breath sounds normal. No respiratory distress. He has no wheezes. He has no rhonchi.   Abdominal: Abdomen is soft. There is no abdominal tenderness.   Musculoskeletal:         General: Normal range of motion.      Cervical back: No rigidity.      Left ankle: Swelling present. No deformity or ecchymosis. No tenderness. Normal pulse.      Left Achilles Tendon: No defects. Luz's test negative.      Comments: There is no surrounding erythema nor break in the skin noted.  Positive distal pulses noted.      Swelling noted to the lateral aspect of the ankle.     Neurological: He is oriented to person, place, and time.   Skin: Skin is warm and dry. No rash noted.   Psychiatric: He has a normal mood and affect. His behavior is normal. Judgment and thought content normal.         ED  Course   Procedures  Labs Reviewed - No data to display       Imaging Results              X-Ray Ankle Complete Left (Final result)  Result time 06/24/24 09:04:39      Final result by Cosme Dougherty MD (06/24/24 09:04:39)                   Impression:      As above.      Electronically signed by: Cosme Dougherty  Date:    06/24/2024  Time:    09:04               Narrative:    EXAMINATION:  XR ANKLE COMPLETE 3 VIEW LEFT    CLINICAL HISTORY:  Unspecified injury of unspecified ankle, initial encounter    TECHNIQUE:  AP, lateral and oblique views of the left ankle were performed.    COMPARISON:  None    FINDINGS:  No acute fracture, dislocation, or osseous destruction.  No asymmetric mortise widening.  Talar dome is intact.  Probable os perineum on lateral view.  Calcaneal enthesophyte at the distal Achilles insertion.                                       Medications   amLODIPine tablet 5 mg (5 mg Oral Given 6/24/24 0915)   ibuprofen tablet 800 mg (800 mg Oral Given 6/24/24 0915)     Medical Decision Making  Differential Diagnosis includes, but is not limited to:  Fracture, dislocation, compartment syndrome, nerve injury/palsy, vascular injury, DVT, rhabdomyolysis, hemarthrosis, septic joint, cellulitis, bursitis, muscle strain, ligament tear/sprain, laceration, foreign body, abrasion, soft tissue contusion, osteoarthritis.       Amount and/or Complexity of Data Reviewed  Radiology: ordered.    Risk  Prescription drug management.               ED Course as of 06/24/24 2018 Mon Jun 24, 2024   0910 FINDINGS:  No acute fracture, dislocation, or osseous destruction.  No asymmetric mortise widening.  Talar dome is intact.  Probable os perineum on lateral view.  Calcaneal enthesophyte at the distal Achilles insertion.     Impression:     As above.   [DT]   0910 Independent interpretation of the chest x-ray does not identify any acute fracture.      Patient will be given crutches in addition to a walking boot and  referred to Podiatry.  He was given his dose of blood pressure medication here in the emergency room as he did not take his blood pressure medicine this morning.  Patient is nontoxic in appearance and is otherwise stable at this time for discharge.  Verbalized understanding of the need for follow-up with Podiatry. [DT]      ED Course User Index  [DT] Lia Sosa NP                           Clinical Impression:  Final diagnoses:  [S99.756V] Ankle injury  [S93.402A] Sprain of left ankle, unspecified ligament, initial encounter (Primary)          ED Disposition Condition    Discharge           ED Prescriptions       Medication Sig Dispense Start Date End Date Auth. Provider    ibuprofen (ADVIL,MOTRIN) 800 MG tablet Take 1 tablet (800 mg total) by mouth every 6 (six) hours as needed for Pain. 20 tablet 6/24/2024 -- Lia Sosa NP          Follow-up Information       Follow up With Specialties Details Why Contact Info    Ventura Swanson DPM Podiatry, Wound Care   [address]  [address]  [facility] [facility] [address]  [phone]               Lia Sosa NP  06/24/24 2018

## 2024-06-25 NOTE — LETTER
[facility] Occupational Health  [address] [address]  Phone: [phone]  Fax: [phone]  [employer]: [email or web address]    Pt Name: Ovidio Montero Jr.  Injury Date: 06/21/2024   Employee ID: [id number] Date of Treatment: 06/25/2024   Company: [employer]      Appointment Time: 10:00 AM Arrived: 9:20 AM    Provider: Nicole Jason MD Time Out:10:23 AM      Office Treatment:   1. Strain of right knee, subsequent encounter    2. Encounter related to worker's compensation claim               Restrictions: Regular Duty, Discharged from Occupational Health     Return As needed. EUGENIA

## 2024-06-25 NOTE — PROGRESS NOTES
Subjective:      Patient ID: Ovidio Montero Jr. is a 28 y.o. male.    Chief Complaint: Knee Injury (Right)    Patient's place of employment - [employer]  Patient's job title - Patient Transport   Date of injury - 4/10/24  Body part injured including left or right - Right Knee  Injury Mechanism - Repetitive motion   What they were doing when they got hurt - His department was short staffed and he was working alone with a more than normal work load. He states that by the end of the shift he was having knee pain.  What they did immediately after - Went home then reported it   Pain scale right now - 0/10    Patient does feel pain with prolonged walking still. Pain scale goes up to 4/10 with injury.    Knee Injury  This is a new problem. The current episode started more than 1 month ago. The problem occurs intermittently. The problem has been gradually improving. Associated symptoms include arthralgias and joint swelling. Pertinent negatives include no myalgias or numbness. The symptoms are aggravated by walking, exertion and bending. He has tried heat, ice, immobilization and acetaminophen (IBU) for the symptoms. The treatment provided moderate relief.       Musculoskeletal:  Positive for joint pain and joint swelling. Negative for muscle ache.   Neurological:  Negative for numbness.     See MA note above. Begin MD note:    Ovidio Montero Jr. is a 28 y.o. presenting for follow-up of right knee. He forgot to follow-up in [facility]. Today, he reports that he returns as he was reminded by Employee Health that his right knee injury claim remains open. He states the restrictions given in April were largely ignored. He has been able to work regular duty with use of ibuprofen once daily in the mornings. He also notes that staffing has improved. He has right knee pain with running around and when they are really busy.   He twisted his left ankle on Friday and had improvements over the weekend but had  increased pain with return to work on Monday so he presented to the ED and was given a boot.   He states that his BP is usually controlled with his medications but of late they haven't been working despite him using them. He is attempting to establish care with a new PCP.     Objective:     Physical Exam  Vitals and nursing note reviewed.   Constitutional:       General: He is not in acute distress.     Appearance: He is obese. He is not ill-appearing.   HENT:      Head: Normocephalic.   Eyes:      Conjunctiva/sclera: Conjunctivae normal.   Pulmonary:      Effort: Pulmonary effort is normal. No respiratory distress.   Musculoskeletal:         General: Tenderness present.      Right knee: Normal range of motion. Tenderness present over the patellar tendon.      Comments: Tib-Fib boot in place to left lower extremity, not wearing socks.   There is tenderness on palpation of anterior aspect of right knee. Right knee ROM is normal. He is able to rise from seated unassisted and without difficulty.   Skin:     General: Skin is warm.      Coloration: Skin is not pale.   Neurological:      General: No focal deficit present.      Mental Status: He is alert and oriented to person, place, and time.      GCS: GCS eye subscore is 4. GCS verbal subscore is 5. GCS motor subscore is 6.      Motor: Motor function is intact.      Coordination: Coordination is intact.   Psychiatric:         Attention and Perception: Attention normal.         Mood and Affect: Mood normal.         Speech: Speech normal.         Behavior: Behavior normal. Behavior is cooperative.         Thought Content: Thought content normal.            Assessment:      1. Strain of right knee, subsequent encounter    2. Encounter related to worker's compensation claim      Plan:     I advised that he contact  to address left ankle injury that he reports occurred at work. Advised that he try to see a podiatrist as recommended on his first visit in [facility]. From  the perspective of his right knee injury he is cleared to return to regular duty.  Discharge from [facility].         Diagnoses and plan discussed with the patient, as well as the expected course and duration of symptoms. Risks and benefits of any medication prescribed during this visit was explained, verbal instructions on use given. Clinic/Emergency department return precautions were given, can return to [facility] before scheduled follow-up appointment if notes worsening/aggravation of symptoms. All questions and concerns were addressed prior to discharge. Plan was developed with active input from the patient and they verbalized understanding of and agreement with the POC.  OE was informed of any referrals and relevant orders.  Note was dictated with voice recognition software, please excuse any grammatical errors.    I spent a total of 25 minutes on the day of the visit.  This includes face to face time and non-face to face time preparing to see the patient (e.g. review of medical record), obtaining and/or reviewing separately obtained history, documenting clinical information in the electronic or other health record, independently interpreting results and communicating results to the patient/family/caregiver, or care coordinator.     Restrictions: Regular Duty, Discharged from Occupational Health  Follow up if symptoms worsen or fail to improve.

## 2024-06-26 ENCOUNTER — OCCUPATIONAL HEALTH (OUTPATIENT)
Dept: URGENT CARE | Facility: CLINIC | Age: 29
End: 2024-06-26

## 2024-06-26 ENCOUNTER — OFFICE VISIT (OUTPATIENT)
Dept: URGENT CARE | Facility: CLINIC | Age: 29
End: 2024-06-26
Payer: COMMERCIAL

## 2024-06-26 VITALS
RESPIRATION RATE: 20 BRPM | SYSTOLIC BLOOD PRESSURE: 191 MMHG | WEIGHT: 315 LBS | BODY MASS INDEX: 46.65 KG/M2 | HEART RATE: 101 BPM | HEIGHT: 69 IN | TEMPERATURE: 99 F | DIASTOLIC BLOOD PRESSURE: 126 MMHG | OXYGEN SATURATION: 97 %

## 2024-06-26 DIAGNOSIS — S86.012A STRAIN OF ACHILLES TENDON, LEFT, INITIAL ENCOUNTER: ICD-10-CM

## 2024-06-26 DIAGNOSIS — S93.402A MODERATE LEFT ANKLE SPRAIN, INITIAL ENCOUNTER: ICD-10-CM

## 2024-06-26 DIAGNOSIS — Z02.83 ENCOUNTER FOR DRUG SCREENING: Primary | ICD-10-CM

## 2024-06-26 DIAGNOSIS — Z02.6 ENCOUNTER RELATED TO WORKER'S COMPENSATION CLAIM: Primary | ICD-10-CM

## 2024-06-26 LAB
CTP QC/QA: YES
POC 10 PANEL DRUG SCREEN: NEGATIVE

## 2024-06-26 PROCEDURE — 99203 OFFICE O/P NEW LOW 30 MIN: CPT | Mod: S$GLB,,, | Performed by: EMERGENCY MEDICINE

## 2024-06-26 RX ORDER — BUPROPION HYDROCHLORIDE 100 MG/1
100 TABLET, EXTENDED RELEASE ORAL EVERY MORNING
COMMUNITY

## 2024-06-26 RX ORDER — ONDANSETRON 4 MG/1
4 TABLET, ORALLY DISINTEGRATING ORAL EVERY 6 HOURS PRN
COMMUNITY
Start: 2024-03-12

## 2024-06-26 RX ORDER — ACETAMINOPHEN 500 MG
TABLET ORAL
COMMUNITY

## 2024-06-26 RX ORDER — LOSARTAN POTASSIUM 100 MG/1
200 TABLET ORAL
COMMUNITY

## 2024-06-26 NOTE — LETTER
[facility] - Occupational Health  [address] [address]  Phone: [phone]  Fax: [phone]  [facility]: [id number]    Pt Name: Ovidio Montero Jr.  Injury Date: 06/21/2024   Employee ID: [id number] Date of First Treatment: 06/26/2024   Company: [employer]      Appointment Time:  Arrived: 8:54 AM    Provider: Nick Garcia MD Time Out:10:35 AM      Office Treatment:   1. Encounter related to worker's compensation claim    2. Moderate left ankle sprain, initial encounter    3. Strain of Achilles tendon, left, initial encounter          Patient Instructions: Attention not to aggravate affected area, Elevated affected area, Use splint as directed      Restrictions: Sit or stand as needed, No Prolonged standing/walking (ALLOW TO WEAR BOOT)     Return Appointment: 7/1/2024 at 1:00 PM KATLYN

## 2024-06-26 NOTE — PROGRESS NOTES
Subjective:      Patient ID: Ovidio Montero Jr. is a 28 y.o. male.    Chief Complaint: Ankle Injury (LT)    Patient's place of employment - [employer]  Patient's job title - Patient Transporter  Date of injury - 06-21-24  Body part injured including left or right - LT Ankle  Injury Mechanism - Twist/Rolling  What they were doing when they got hurt - Moving a patient in their bed after a procedure and rolled his LT Ankle  What they did immediately after - Went to [facility] were x-rays were done and place in a walking boot  Pain scale right now - 3/10    PATIENT IS A 28-YEAR-OLD MALE WHO WAS RECENTLY CLEARED FOR RIGHT KNEE PAIN WHO STATES THAT ON JUNE 21ST 5 DAYS AGO HE HAD A SLIGHT INVERSION INJURY WITH RESULTANT LATERAL ANKLE DISCOMFORT AND PAIN WITH INVERSION AS WELL AS SOME ACHILLES INSERTION PAIN.  HE WAS SEEN AT URGENT CARE AND X-RAYS WERE PERFORMED AND WERE NEGATIVE FOR BONY INJURY.  HE WAS PLACED IN A WALKING BOOT AND REFERRED TO OCCUPATIONAL HEALTH.  THE RIGHT KNEE HAS CONTINUED TO BE NORMAL AND BASELINE AND IMPROVED AND THE LEFT ANKLE SHOWS NO SIGNIFICANT SWELLING WITH MILD TENDERNESS TO THE LATERAL LIGAMENTOUS COMPLEX AND ACHILLES TENDON DISTALLY.  NEGATIVE DESOUZA TEST AND AMBULATORY WITHOUT DIFFICULTY.  HE HAS HIS MEDICATION INCLUDING ANTI-INFLAMMATORY IBUPROFEN AND VOLTAREN GEL.  WILL INITIATE LIGHT DUTY RESTRICTIONS FOR 5 DAYS AND HE WILL RETURN ON MONDAY.  I ENCOURAGED HIM TO TRANSITION FROM BOOT TO SOCKS AND TENNIS SHOES AND EARLY RANGE OF MOTION EXERCISES AT HOME.  RETURN TO CLINIC 5 DAYS    Ankle Injury   Pertinent negatives include no numbness.     ROS  Constitution: Negative for chills and fever.   HENT:  Negative for postnasal drip, sinus pain and sore throat.    Neck: Negative for neck pain and neck stiffness.   Cardiovascular:  Negative for chest pain and palpitations.   Respiratory:  Negative for cough and shortness of breath.    Genitourinary:  Negative for dysuria and  hematuria.   Musculoskeletal:  Positive for pain, joint pain, joint swelling, abnormal ROM of joint, pain with walking and muscle ache. Negative for trauma and muscle cramps.   Skin:  Negative for wound, laceration, erythema and bruising.   Neurological:  Negative for altered mental status, numbness and tingling.   Psychiatric/Behavioral:  Negative for altered mental status.      Objective:     Physical Exam  Vitals and nursing note reviewed.   Constitutional:       General: He is not in acute distress.     Appearance: He is obese. He is not ill-appearing.   HENT:      Head: Normocephalic.   Eyes:      Conjunctiva/sclera: Conjunctivae normal.   Pulmonary:      Effort: Pulmonary effort is normal. No respiratory distress.   Musculoskeletal:         General: Tenderness present.      Right knee: Normal range of motion. Tenderness present over the patellar tendon.      Comments: Tib-Fib boot in place to left lower extremity, not wearing socks.   There is RESOLUTION tenderness on palpation of anterior aspect of right knee. Right knee ROM is normal. He is able to rise from seated unassisted and without difficulty.    EXAMINATION OF THE LEFT FOOT AND ANKLE SHOWS MILD TENDERNESS TO PALPATION OF THE LEFT ACHILLES INSERTION AND LATERAL LIGAMENTOUS COMPLEX.  DISTALLY NEUROVASCULARLY INTACT.  IS IN BOOT AND REMOVED.  NO BRUISING NO SIGNIFICANT SWELLING.  CONSISTENT WITH ACHILLES STRAIN AND LEFT ANKLE SPRAIN.  PAIN EXACERBATED WITH INVERSION   Skin:     General: Skin is warm.      Coloration: Skin is not pale.      Findings: No erythema.   Neurological:      General: No focal deficit present.      Mental Status: He is alert and oriented to person, place, and time.      GCS: GCS eye subscore is 4. GCS verbal subscore is 5. GCS motor subscore is 6.      Motor: Motor function is intact.      Coordination: Coordination is intact.   Psychiatric:         Attention and Perception: Attention normal.         Mood and Affect: Mood normal.          Speech: Speech normal.         Behavior: Behavior normal. Behavior is cooperative.         Thought Content: Thought content normal.        X-Ray Ankle Complete Left    Result Date: 6/24/2024  EXAMINATION: XR ANKLE COMPLETE 3 VIEW LEFT CLINICAL HISTORY: Unspecified injury of unspecified ankle, initial encounter TECHNIQUE: AP, lateral and oblique views of the left ankle were performed. COMPARISON: None FINDINGS: No acute fracture, dislocation, or osseous destruction.  No asymmetric mortise widening.  Talar dome is intact.  Probable os perineum on lateral view.  Calcaneal enthesophyte at the distal Achilles insertion.     As above. Electronically signed by: Cosme Dougherty Date:    06/24/2024 Time:    09:04       Assessment:      1. Encounter related to worker's compensation claim    2. Moderate left ankle sprain, initial encounter    3. Strain of Achilles tendon, left, initial encounter      Plan:     PATIENT IS A 28-YEAR-OLD MALE WHO WAS RECENTLY CLEARED FOR RIGHT KNEE PAIN WHO STATES THAT ON JUNE 21ST 5 DAYS AGO HE HAD A SLIGHT INVERSION INJURY WITH RESULTANT LATERAL ANKLE DISCOMFORT AND PAIN WITH INVERSION AS WELL AS SOME ACHILLES INSERTION PAIN.  HE WAS SEEN AT URGENT CARE AND X-RAYS WERE PERFORMED AND WERE NEGATIVE FOR BONY INJURY.  HE WAS PLACED IN A WALKING BOOT AND REFERRED TO OCCUPATIONAL HEALTH.  THE RIGHT KNEE HAS CONTINUED TO BE NORMAL AND BASELINE AND IMPROVED AND THE LEFT ANKLE SHOWS NO SIGNIFICANT SWELLING WITH MILD TENDERNESS TO THE LATERAL LIGAMENTOUS COMPLEX AND ACHILLES TENDON DISTALLY.  NEGATIVE DESOUZA TEST AND AMBULATORY WITHOUT DIFFICULTY.  HE HAS HIS MEDICATION INCLUDING ANTI-INFLAMMATORY IBUPROFEN AND VOLTAREN GEL.  WILL INITIATE LIGHT DUTY RESTRICTIONS FOR 5 DAYS AND HE WILL RETURN ON MONDAY.  I ENCOURAGED HIM TO TRANSITION FROM BOOT TO SOCKS AND TENNIS SHOES AND EARLY RANGE OF MOTION EXERCISES AT HOME.  RETURN TO CLINIC 5 DAYS     Patient Instructions: Attention not to aggravate  affected area, Elevated affected area, Use splint as directed   Restrictions: Sit or stand as needed, No Prolonged standing/walking (ALLOW TO WEAR BOOT)  Follow up in about 4 years (around 6/26/2028).

## 2024-07-01 ENCOUNTER — OFFICE VISIT (OUTPATIENT)
Dept: URGENT CARE | Facility: CLINIC | Age: 29
End: 2024-07-01
Payer: COMMERCIAL

## 2024-07-01 VITALS
HEIGHT: 69 IN | OXYGEN SATURATION: 98 % | HEART RATE: 98 BPM | BODY MASS INDEX: 46.65 KG/M2 | DIASTOLIC BLOOD PRESSURE: 92 MMHG | SYSTOLIC BLOOD PRESSURE: 154 MMHG | WEIGHT: 315 LBS | RESPIRATION RATE: 18 BRPM

## 2024-07-01 DIAGNOSIS — S93.402D MODERATE LEFT ANKLE SPRAIN, SUBSEQUENT ENCOUNTER: ICD-10-CM

## 2024-07-01 DIAGNOSIS — Z02.6 ENCOUNTER RELATED TO WORKER'S COMPENSATION CLAIM: Primary | ICD-10-CM

## 2024-07-01 DIAGNOSIS — S86.012D STRAIN OF LEFT ACHILLES TENDON, SUBSEQUENT ENCOUNTER: ICD-10-CM

## 2024-07-01 PROCEDURE — 99213 OFFICE O/P EST LOW 20 MIN: CPT | Mod: S$GLB,,, | Performed by: NURSE PRACTITIONER

## 2024-07-01 NOTE — LETTER
[facility]  [address] [address]  Phone: [phone]  Fax: [phone]  [facility]: [id number]    Pt Name: Ovidio Montero Jr.  Injury Date: 06/21/2024   Employee ID: [id number] Date of Treatment: 07/01/2024   Company: [employer]      Appointment Time: 12:45 PM Arrived: 12:43 pm   Provider: Miladys Bonilla NP Time Out:1:30 pm     Office Treatment:   1. Encounter related to worker's compensation claim    2. Moderate left ankle sprain, subsequent encounter    3. Strain of left Achilles tendon, subsequent encounter          Patient Instructions: Attention not to aggravate affected area, Daily home exercises/warm soaks, Use splint as directed (Wean out of boot. Elastic bandage and tennis shoe & sock.)        Restrictions: Regular Duty     Return Appointment: 7/8/2024 at 9:00 am    Virtual Visit (Please sign into [employer] before appointment time).    RAVEN

## 2024-07-01 NOTE — PROGRESS NOTES
"Subjective:      Patient ID: Ovidio Montero Jr. is a 28 y.o. male.    Chief Complaint: Ankle Pain    Patient's place of employment - [employer]  Patient's job title - Patient Transporter  Date of injury - 06-21-24  Body part injured - Left Ankle  Current work status per last visit - Regular Duty with Restrictions   Improved, same, or worse - Improving   Pain Scale right now (1-10) -  1/10 "only when I apply pressure to it"  SB.     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  He is here for follow-up for left ankle sprain.  He has feeling a lot better, having some pain to the lower Achilles area.  He wears the boot at work and does regular duty and tolerates well.  He has been weaning out of the boot at home.  Not wearing the boot presently.  He is wearing tennis shoes today.  He takes 800 mg of ibuprofen in the morning before work.  Reports he is feeling better than that last visit.  I reviewed the previous office visit notes, ED report and x-ray.  MWT        Constitution: Negative for chills and fever.   HENT:  Negative for postnasal drip, sinus pain and sore throat.    Neck: Negative for neck pain and neck stiffness.   Cardiovascular:  Negative for chest pain and palpitations.   Respiratory:  Negative for cough and shortness of breath.    Gastrointestinal:  Negative for abdominal pain, nausea and vomiting.   Genitourinary:  Negative for dysuria and hematuria.   Musculoskeletal:  Positive for pain. Negative for trauma, joint pain, joint swelling, abnormal ROM of joint, pain with walking, muscle cramps and muscle ache.   Skin:  Negative for wound, laceration, erythema and bruising.   Neurological:  Negative for altered mental status, numbness and tingling.   Psychiatric/Behavioral:  Negative for altered mental status.      Objective:     Physical Exam  Constitutional:       General: He is not in acute distress.     Appearance: Normal appearance. He is obese.   HENT:      Right Ear: External ear normal.      " Left Ear: External ear normal.   Eyes:      Conjunctiva/sclera: Conjunctivae normal.   Cardiovascular:      Pulses: Normal pulses.   Pulmonary:      Effort: Pulmonary effort is normal.   Musculoskeletal:         General: Tenderness present. No swelling.      Left ankle: No swelling, deformity or ecchymosis. Tenderness present. Normal range of motion. Anterior drawer test negative. Normal pulse.      Left Achilles Tendon: Tenderness present. No defects. Luz's test negative.        Legs:       Comments: Tender to palpation to lower Achilles left ankle.  He is ambulating well.  Does have pain with toe walking.  No significant swelling to left ankle.  Good range of motion.  Some pain with dorsiflexion and plantar flexion.  Neurovascular intact distally.   Skin:     General: Skin is warm and dry.      Capillary Refill: Capillary refill takes less than 2 seconds.      Findings: No bruising or erythema.   Neurological:      General: No focal deficit present.      Mental Status: He is alert and oriented to person, place, and time.   Psychiatric:         Mood and Affect: Mood normal.         Behavior: Behavior normal.         Thought Content: Thought content normal.         Judgment: Judgment normal.        Assessment:      1. Encounter related to worker's compensation claim    2. Moderate left ankle sprain, subsequent encounter    3. Strain of left Achilles tendon, subsequent encounter      Plan:   Patient is here for follow-up for left ankle sprain and left Achilles strain.  He is improving.  Continues to have pain to the Achilles region.  He is able to ambulate without the boot now.  Instructed to wean out of the boot at work and to try elastic bandage with tennis shoe and sock.  He is ibuprofen at home that he may take as needed for pain.  Reviewed warm soaks and range-of-motion stretches as well as towel exercises.    X-Ray Ankle Complete Left    Result Date: 6/24/2024  EXAMINATION: XR ANKLE COMPLETE 3 VIEW LEFT  CLINICAL HISTORY: Unspecified injury of unspecified ankle, initial encounter TECHNIQUE: AP, lateral and oblique views of the left ankle were performed. COMPARISON: None FINDINGS: No acute fracture, dislocation, or osseous destruction.  No asymmetric mortise widening.  Talar dome is intact.  Probable os perineum on lateral view.  Calcaneal enthesophyte at the distal Achilles insertion.     As above. Electronically signed by: Cosme Dougherty Date:    06/24/2024 Time:    09:04    I have reviewed the left ankle x-ray that was done in the emergency room which shows no acute fracture or dislocation.     Patient Instructions: Attention not to aggravate affected area, Daily home exercises/warm soaks, Use splint as directed (Wean out of boot. Elastic bandage and tennis shoe & sock.)   Restrictions: Regular Duty  Follow up in about 1 week (around 7/8/2024) for Virtual Visit.    I spent a total of 20 minutes on the day of the visit.This includes face to face time and non-face to face time preparing to see the patient (eg, review of tests), obtaining and/or reviewing separately obtained history, documenting clinical information in the electronic or other health record, independently interpreting results and communicating results to the patient/family/caregiver, or care coordinator.

## 2024-07-08 ENCOUNTER — OFFICE VISIT (OUTPATIENT)
Dept: URGENT CARE | Facility: CLINIC | Age: 29
End: 2024-07-08
Payer: COMMERCIAL

## 2024-07-08 VITALS
DIASTOLIC BLOOD PRESSURE: 103 MMHG | HEIGHT: 68 IN | BODY MASS INDEX: 47.74 KG/M2 | HEART RATE: 119 BPM | WEIGHT: 315 LBS | OXYGEN SATURATION: 97 % | SYSTOLIC BLOOD PRESSURE: 170 MMHG | RESPIRATION RATE: 20 BRPM | TEMPERATURE: 98 F

## 2024-07-08 DIAGNOSIS — S93.402D MODERATE LEFT ANKLE SPRAIN, SUBSEQUENT ENCOUNTER: ICD-10-CM

## 2024-07-08 DIAGNOSIS — Z02.6 ENCOUNTER RELATED TO WORKER'S COMPENSATION CLAIM: Primary | ICD-10-CM

## 2024-07-08 DIAGNOSIS — S86.012D STRAIN OF LEFT ACHILLES TENDON, SUBSEQUENT ENCOUNTER: ICD-10-CM

## 2024-07-08 PROCEDURE — 99213 OFFICE O/P EST LOW 20 MIN: CPT | Mod: S$GLB,,, | Performed by: NURSE PRACTITIONER

## 2024-07-08 NOTE — PROGRESS NOTES
Subjective:      Patient ID: Ovidio Montero Jr. is a 28 y.o. male.    Chief Complaint: Foot Injury    Patient's place of employment - [employer]  Patient's job title - Transport  Date of Injury - 6/21/24  Body part injured - left ankle / foot  Current work status per last visit - Regular duty  Improved, same, or worse - worse they wont let him work light duty  Pain Scale right now (1-10) -  0    Patient returned to regular duty and did not tolerate.  Having increased pain to the left heel.  The pain to the medial or lateral malleolus.  Ambulating with a limp.  He returns to the clinic today with the walker boot on.  Says it is much more comfortable with a walker boot.  Has been taking over-the-counter ibuprofen up to 600 mg.  Blood pressure elevated today.  He is upset today, emotional.  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  He never did do light duty but returned to work doing regular duty just after the injury.MWT    Foot Injury   Pertinent negatives include no numbness.       Constitution: Negative for chills and fever.   HENT:  Negative for postnasal drip, sinus pain and sore throat.    Neck: Negative for neck pain and neck stiffness.   Cardiovascular:  Negative for chest pain and palpitations.   Respiratory:  Negative for cough and shortness of breath.    Gastrointestinal:  Negative for abdominal pain, nausea and vomiting.   Genitourinary:  Negative for dysuria and hematuria.   Musculoskeletal:  Positive for pain, joint pain, joint swelling, abnormal ROM of joint and pain with walking. Negative for trauma, muscle cramps and muscle ache.   Skin:  Positive for erythema. Negative for wound, laceration and bruising.   Neurological:  Positive for tingling. Negative for altered mental status and numbness.   Psychiatric/Behavioral:  Negative for altered mental status.      Objective:     Physical Exam  Constitutional:       General: He is not in acute  distress.     Appearance: Normal appearance.   HENT:      Right Ear: External ear normal.      Left Ear: External ear normal.   Eyes:      Conjunctiva/sclera: Conjunctivae normal.   Cardiovascular:      Pulses: Normal pulses.   Pulmonary:      Effort: Pulmonary effort is normal.   Musculoskeletal:         General: Swelling and tenderness present. No deformity.      Left ankle: Swelling present. No deformity or ecchymosis. Tenderness present. No lateral malleolus, medial malleolus, ATF ligament or AITF ligament tenderness. Normal range of motion. Anterior drawer test negative. Normal pulse.      Left Achilles Tendon: Tenderness present. No defects. Luz's test negative.        Legs:       Comments: Tender to palpation over insertion point of left Achilles.  No defect.  Full range of motion.  Ambulates with limp.  Negative Luz test.   Skin:     Capillary Refill: Capillary refill takes less than 2 seconds.      Findings: Erythema present. No bruising.   Neurological:      General: No focal deficit present.      Mental Status: He is alert and oriented to person, place, and time.   Psychiatric:         Mood and Affect: Mood normal.         Behavior: Behavior normal.         Thought Content: Thought content normal.         Judgment: Judgment normal.        Assessment:      1. Encounter related to worker's compensation claim    2. Moderate left ankle sprain, subsequent encounter    3. Strain of left Achilles tendon, subsequent encounter      Plan:   Zack has experienced a setback.  He has wearing the walker boot once again.  He has been doing regular duty ever since the injury.  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  I told him to contact Employee Health regarding light duty restrictions.  He walks 9 or 10,000 steps within 4-5 hours.  I think he has been on his feet too much and therefore has experienced a setback.  Therefore, I have placed him back on light  duty.     His blood pressure is elevated today.  He is asymptomatic.  He does take blood pressure medication.  Instructed to follow-up with PCP.  Discussed emergency room precautions in the risks of uncontrolled high blood pressure.  I have not prescribed any NSAIDs due to elevated BP.  Cautioned patient about taking any NSAIDs due to elevated blood pressure.  He may take over-the-counter Tylenol or extra-strength Tylenol per label directions.  Also may take over-the-counter Voltaren gel.    Lace-up splint was given today.  He will attempt to wean out of the boot and into a regular tennis shoe using lace-up splint.    Patient wants to return to clinic on Monday, his day off.  Prefers a face-to-face visit rather than a virtual visit.         Patient Instructions: Attention not to aggravate affected area, Daily home exercises/warm soaks, Elevated affected area, Use splint as directed (May alternate ice and heat 10 min as desired.)   Restrictions: No Prolonged standing/walking, Sedentary work only, Sit or stand as needed (Allow to wear boot at work.)  Follow up in about 1 week (around 7/15/2024).    I spent a total of 30 minutes on the day of the visit.This includes face to face time and non-face to face time preparing to see the patient (eg, review of tests), obtaining and/or reviewing separately obtained history, documenting clinical information in the electronic or other health record, independently interpreting results and communicating results to the patient/family/caregiver, or care coordinator.

## 2024-07-08 NOTE — LETTER
[facility]  [address] [address]  Phone: [phone]  Fax: [phone]  [employer] [employer] [facility]: [id number]     Name: Ovidio Montero Jr.  Injury Date: 06/21/2024   Employee ID: [id number] Date of Treatment: 07/08/2024   Company: [employer]      Appointment Time: 08:45 AM Arrived: 8:46 am   Provider: Miladys Bonilla NP Time Out:10:00 am     Office Treatment:   1. Encounter related to worker's compensation claim    2. Moderate left ankle sprain, subsequent encounter    3. Strain of left Achilles tendon, subsequent encounter          Patient Instructions: Attention not to aggravate affected area, Daily home exercises/warm soaks, Elevated affected area, Use splint as directed (May alternate ice and heat 10 min as desired.)    Restrictions: No Prolonged standing/walking, Sedentary work only, Sit or stand as needed (Allow to wear boot at work.)     Return Appointment: 7/15/2024 at 8:30 am

## 2024-07-15 ENCOUNTER — OFFICE VISIT (OUTPATIENT)
Dept: URGENT CARE | Facility: CLINIC | Age: 29
End: 2024-07-15
Payer: COMMERCIAL

## 2024-07-15 VITALS
RESPIRATION RATE: 14 BRPM | WEIGHT: 315 LBS | HEIGHT: 68 IN | DIASTOLIC BLOOD PRESSURE: 90 MMHG | HEART RATE: 94 BPM | OXYGEN SATURATION: 98 % | BODY MASS INDEX: 47.74 KG/M2 | SYSTOLIC BLOOD PRESSURE: 152 MMHG

## 2024-07-15 DIAGNOSIS — Z02.6 ENCOUNTER RELATED TO WORKER'S COMPENSATION CLAIM: ICD-10-CM

## 2024-07-15 DIAGNOSIS — S93.402D MODERATE LEFT ANKLE SPRAIN, SUBSEQUENT ENCOUNTER: Primary | ICD-10-CM

## 2024-07-15 DIAGNOSIS — S86.012D STRAIN OF LEFT ACHILLES TENDON, SUBSEQUENT ENCOUNTER: ICD-10-CM

## 2024-07-15 PROCEDURE — 99214 OFFICE O/P EST MOD 30 MIN: CPT | Mod: S$GLB,,, | Performed by: SURGERY

## 2024-07-15 NOTE — PROGRESS NOTES
Subjective:      Patient ID: Ovidio Montero Jr. is a 28 y.o. male.    Chief Complaint: Foot Injury (LT)    Patient's place of employment - [employer]  Patient's job title - [employer]  Date of Injury - 06/21/2024  Body part injured - lt foot  Current work status per last visit -  No Prolonged standing/walking, Sedentary work only, Sit or stand as needed (Allow to wear boot at work.)  Improved, same, or worse - improved  Pain Scale right now (1-10) -  2/10    Foot Injury   Pertinent negatives include no numbness.       Musculoskeletal:  Positive for pain, trauma, joint pain, joint swelling, abnormal ROM of joint and pain with walking. Negative for muscle cramps and muscle ache.   Skin:  Negative for erythema.   Neurological:  Negative for numbness and tingling.     See MA note above. Begin MD note:    Ovidio Montero Jr. is a 28 y.o. presenting for follow-up of left ankle injury. He reports that he still has some pain. He soaked his foot/ankle this morning and that helped a bit. He notices the swelling most in the morning. He's been out of work as they don't have light duty available. He reports that he doesn't always do a good job advocating for himself when his supervisors don't abide by the restrictions he was given.    Objective:     Physical Exam  Constitutional:       General: He is not in acute distress.     Appearance: Normal appearance. He is morbidly obese.   HENT:      Right Ear: External ear normal.      Left Ear: External ear normal.   Eyes:      Conjunctiva/sclera: Conjunctivae normal.   Cardiovascular:      Pulses: Normal pulses.   Pulmonary:      Effort: Pulmonary effort is normal.   Musculoskeletal:         General: Swelling and tenderness present. No deformity.      Left ankle: Swelling present. No deformity or ecchymosis. Tenderness present. No lateral malleolus, medial malleolus, ATF ligament or AITF ligament tenderness. Normal range of motion. Anterior drawer test negative.  Normal pulse.      Left Achilles Tendon: Tenderness present. No defects. Luz's test negative.        Legs:       Comments: Tender to palpation over insertion point of left Achilles.  No defect.  Full range of motion. Pain with plantarflexion and eversion.  Ambulates with limp.   Skin:     Capillary Refill: Capillary refill takes less than 2 seconds.      Findings: No bruising or erythema.   Neurological:      General: No focal deficit present.      Mental Status: He is alert and oriented to person, place, and time.   Psychiatric:         Mood and Affect: Mood normal.         Behavior: Behavior normal.         Thought Content: Thought content normal.         Judgment: Judgment normal.          Assessment:      1. Moderate left ankle sprain, subsequent encounter    2. Encounter related to worker's compensation claim    3. Strain of left Achilles tendon, subsequent encounter      Plan:     I reviewed the clinic notes related to this injury. I counseled him about the importance of adhering to restrictions to enable active rehabilitation without further aggravation of his injury. He was encouraged to contact  and HR as needed to ensure that he is not working outside the restrictions. He is to wear the brace while working. I will send him PT ankle exercise handout so that he can continue working on strengthening his ankle. If no improvements noted, will refer to PT. Restrictions given. Follow-up virtually in 3 days to assess ability to trial return to regular duty.        Diagnoses and plan discussed with the patient, as well as the expected course and duration of symptoms. Risks and benefits of any medication prescribed during this visit was explained, verbal instructions on use given. Clinic/Emergency department return precautions were given, can return [facility] before scheduled follow-up appointment if notes worsening/aggravation of symptoms. All questions and concerns were addressed prior to discharge.  Plan was developed with active input from the patient and they verbalized understanding of and agreement with the POC.  [facility] was informed of any referrals and relevant orders.  Note was dictated with voice recognition software, please excuse any grammatical errors.    I spent a total of 30 minutes on the day of the visit.  This includes face to face time and non-face to face time preparing to see the patient (e.g. review of medical record), obtaining and/or reviewing separately obtained history, documenting clinical information in the electronic or other health record, independently interpreting results and communicating results to the patient/family/caregiver, or care coordinator.    Patient Instructions: Daily home exercises/warm soaks, Use splint as directed (Wear brace while at work)   Restrictions: No Prolonged standing/walking, Sit or stand as needed (Allow frequent rest breaks to prevent aggravation of ankle injury)  Follow up in about 3 days (around 7/18/2024) for Virtual Visit.

## 2024-07-15 NOTE — LETTER
[facility]  [address] [address]  Phone: [phone]  Fax: [phone]  [employer] [employer]: [id number]     Name: Ovidio Montero Jr.  Injury Date: 06/21/2024   Employee ID: [id number] Date of Treatment: 07/15/2024   Company: [employer]      Appointment Time: 08:30 AM Arrived: 8:30 AM   Provider: Nicole Jason MD Time Out:9:11 AM      Office Treatment:   1. Moderate left ankle sprain, subsequent encounter    2. Encounter related to worker's compensation claim    3. Strain of left Achilles tendon, subsequent encounter          Patient Instructions: Daily home exercises/warm soaks, Use splint as directed (Wear brace while at work)      Restrictions: No Prolonged standing/walking, Sit or stand as needed (Allow frequent rest breaks to prevent aggravation of ankle injury)     Return Appointment: 7/18/2024 at 10:00 AM [facility] [place]

## 2024-07-17 ENCOUNTER — PATIENT MESSAGE (OUTPATIENT)
Dept: URGENT CARE | Facility: CLINIC | Age: 29
End: 2024-07-17
Payer: COMMERCIAL

## 2024-07-18 ENCOUNTER — OFFICE VISIT (OUTPATIENT)
Dept: URGENT CARE | Facility: CLINIC | Age: 29
End: 2024-07-18
Payer: COMMERCIAL

## 2024-07-18 DIAGNOSIS — S93.402D MODERATE LEFT ANKLE SPRAIN, SUBSEQUENT ENCOUNTER: Primary | ICD-10-CM

## 2024-07-18 DIAGNOSIS — Z02.6 ENCOUNTER RELATED TO WORKER'S COMPENSATION CLAIM: ICD-10-CM

## 2024-07-18 DIAGNOSIS — S86.012D STRAIN OF LEFT ACHILLES TENDON, SUBSEQUENT ENCOUNTER: ICD-10-CM

## 2024-07-18 PROCEDURE — 99213 OFFICE O/P EST LOW 20 MIN: CPT | Mod: 95,,, | Performed by: SURGERY

## 2024-07-18 NOTE — PROGRESS NOTES
Subjective:      Patient ID: Ovidio Montero Jr. is a 28 y.o. male.    Chief Complaint: Foot Injury  The patient location is: [place]  The chief complaint leading to consultation is:   Chief Complaint   Patient presents with    Foot Injury       Visit type: audiovisual    Face to Face time with patient: 15  I spent a total of 25 minutes on the day of the visit.    minutes of total time spent on the encounter, which includes face to face time and non-face to face time preparing to see the patient (eg, review of tests), Obtaining and/or reviewing separately obtained history, Documenting clinical information in the electronic or other health record, Independently interpreting results (not separately reported) and communicating results to the patient/family/caregiver, or Care coordination (not separately reported).     Each patient to whom he or she provides medical services by telemedicine is:  (1) informed of the relationship between the physician and patient and the respective role of any other health care provider with respect to management of the patient; and (2) notified that he or she may decline to receive medical services by telemedicine and may withdraw from such care at any time.      Foot Injury       WONG Rollins MD note: He reports no improvements in pain symptoms. 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 He notes that this is the first time he has sprained something in his life. He is worried about recovery time and losing his job.   He received the exercises sent via [employer]. He has been able to do them without issue. He notes tightness at night when sleeping, flexing hurts a lot.     Objective:     Physical Exam  Vitals reviewed: Deferred due to telemedicine visit.   Constitutional:       General: He is not in acute distress.     Appearance: He is not ill-appearing.   HENT:      Head: Normocephalic.   Eyes:      Conjunctiva/sclera: Conjunctivae  normal.   Pulmonary:      Effort: Pulmonary effort is normal. No respiratory distress.   Musculoskeletal:      Left ankle: No swelling. Decreased range of motion.      Left Achilles Tendon: Tenderness present.      Comments: Pain with flexing   Skin:     General: Skin is warm.      Coloration: Skin is not pale.   Neurological:      General: No focal deficit present.      Mental Status: He is alert and oriented to person, place, and time.      GCS: GCS eye subscore is 4. GCS verbal subscore is 5. GCS motor subscore is 6.      Motor: Motor function is intact.      Coordination: Coordination is intact.   Psychiatric:         Attention and Perception: Attention normal.         Mood and Affect: Mood normal.         Speech: Speech normal.         Behavior: Behavior normal. Behavior is cooperative.         Thought Content: Thought content normal.        Assessment:      1. Moderate left ankle sprain, subsequent encounter    2. Encounter related to worker's compensation claim    3. Strain of left Achilles tendon, subsequent encounter      Plan:     Advised not to use the boot while sitting at home, use to ambulate. He would benefit from a course of PT, referral sent. He is to contact his WC adjustor regarding his questions about WC. I advised that initiation of PT can cause a transient increase in his pain symptoms, he should use the prescribed medications prior to or following PT to address this pain, it should improve as he progress through PT. Work restrictions are to be gradually reduced as he notes improvements with PT treatment. Follow-up after completion of first portion of PT treatment course.         Diagnoses and plan discussed with the patient, as well as the expected course and duration of symptoms. Risks and benefits of any medication prescribed during this visit was explained, verbal instructions on use given. Clinic/Emergency department return precautions were given, can return to [facility] before scheduled  follow-up appointment if notes worsening/aggravation of symptoms. All questions and concerns were addressed prior to discharge. Plan was developed with active input from the patient and they verbalized understanding of and agreement with the POC.  [facility] was informed of any referrals and relevant orders.  Note was dictated with voice recognition software, please excuse any grammatical errors.    I spent a total of 25 minutes on the day of the visit.  This includes face to face time and non-face to face time preparing to see the patient (e.g. review of medical record), obtaining and/or reviewing separately obtained history, documenting clinical information in the electronic or other health record, independently interpreting results and communicating results to the patient/family/caregiver, or care coordinator.    Patient Instructions: PT to be scheduled once authorized, Begin Physical Therapy   Restrictions: Sit or stand as needed, No Prolonged standing/walking, Sit down work only (Allow frequent rest breaks to prevent aggravation of ankle injury)  Follow up in about 1 month (around 8/20/2024).

## 2024-07-18 NOTE — LETTER
[facility]  [address] [address]  Phone: [phone]  Fax: [phone]  [employer] [employer] [facility]: [id number]    Pt Name: Ovidio Montero Jr.  Injury Date: 06/21/2024   Employee ID: [id number] Date of Treatment: 07/18/2024   Company: [employer]      Appointment Time: 10:30 AM Arrived: 10:30 AM    Provider: Nicole Jason MD Time Out:10:45 AM      Office Treatment:   1. Moderate left ankle sprain, subsequent encounter    2. Encounter related to worker's compensation claim    3. Strain of left Achilles tendon, subsequent encounter          Patient Instructions: PT to be scheduled once authorized, Begin Physical Therapy      Restrictions: Sit or stand as needed, No Prolonged standing/walking, Sit down work only (Allow frequent rest breaks to prevent aggravation of ankle injury)     Return Appointment: 8/20/2024 at 9:00 AM KATLYN

## 2024-08-02 ENCOUNTER — PATIENT MESSAGE (OUTPATIENT)
Dept: INTERNAL MEDICINE | Facility: CLINIC | Age: 29
End: 2024-08-02
Payer: COMMERCIAL

## 2024-08-07 ENCOUNTER — CLINICAL SUPPORT (OUTPATIENT)
Dept: REHABILITATION | Facility: HOSPITAL | Age: 29
End: 2024-08-07
Attending: SURGERY
Payer: COMMERCIAL

## 2024-08-07 DIAGNOSIS — M25.572 ACUTE LEFT ANKLE PAIN: Primary | ICD-10-CM

## 2024-08-07 DIAGNOSIS — M25.672 DECREASED RANGE OF MOTION OF LEFT ANKLE: ICD-10-CM

## 2024-08-07 DIAGNOSIS — R29.898 ANKLE WEAKNESS: ICD-10-CM

## 2024-08-07 PROCEDURE — 97140 MANUAL THERAPY 1/> REGIONS: CPT

## 2024-08-07 PROCEDURE — 97161 PT EVAL LOW COMPLEX 20 MIN: CPT

## 2024-08-16 ENCOUNTER — CLINICAL SUPPORT (OUTPATIENT)
Dept: REHABILITATION | Facility: HOSPITAL | Age: 29
End: 2024-08-16
Payer: COMMERCIAL

## 2024-08-16 DIAGNOSIS — M25.572 ACUTE LEFT ANKLE PAIN: Primary | ICD-10-CM

## 2024-08-16 DIAGNOSIS — R29.898 ANKLE WEAKNESS: ICD-10-CM

## 2024-08-16 DIAGNOSIS — M25.672 DECREASED RANGE OF MOTION OF LEFT ANKLE: ICD-10-CM

## 2024-08-16 PROCEDURE — 97110 THERAPEUTIC EXERCISES: CPT

## 2024-08-16 PROCEDURE — 97530 THERAPEUTIC ACTIVITIES: CPT

## 2024-08-16 PROCEDURE — 97112 NEUROMUSCULAR REEDUCATION: CPT

## 2024-08-16 NOTE — PROGRESS NOTES
[facility]   Physical Therapy Treatment Note      Name: Ovidio Montero Jr.  Clinic Number: [id number]    Therapy Diagnosis:   Encounter Diagnoses   Name Primary?    Acute left ankle pain Yes    Decreased range of motion of left ankle     Ankle weakness      Physician: Nicole Jason MD    Visit Date: 2024    Physician Orders: PT Eval and Treat   Medical Diagnosis from Referral:   Z02.6 (ICD-10-CM) - Encounter related to worker's compensation claim   S93.402D (ICD-10-CM) - Moderate left ankle sprain, subsequent encounter   S86.012D (ICD-10-CM) - Strain of left Achilles tendon, subsequent encounter   Evaluation Date: 2024  Authorization Period Expiration: 2025  Plan of Care Expiration: 10/7/2024  Visit # / Visits authorized:      Foto  Date  Score    #1/3  2024  47%   #2/3       #3/3          Time In: 6:15am  Time Out: 6:57am  Total Appointment Time (timed & untimed codes): 42 minutes    PTA Visit #: 0/5       Subjective     Patient reports: he is feeling pretty good today since its so early. He had some pain on the inside of his lower leg after walking a lot yesterday. He feels like he's definitely getting better but still has a lot of tightness and weakness in his ankle.  He  was not yet given  home exercise program.  Response to previous treatment: last session was IE  Functional change: none reported    Pain: 0/10  Location: left ankles     Objective      Objective Measures updated at progress report unless specified.     Treatment     Ovidio received the treatments listed below:      therapeutic exercises to develop strength, endurance, ROM, flexibility, posture, and core stabilization for 12 minutes includin way ankle 2x10 each RTB  Standing gastroc stretch x2'   Standing soleus stretch x2'      neuromuscular re-education activities to improve: Balance, Coordination, Proprioception, and Posture for 10 minutes. The following activities were  included:  SLS:   -  3x20s  Tandem stance on airex 2x20s B      therapeutic activities to improve functional performance for 20  minutes, including:  Calf raises 3x10  Tib push 3x10  Upright bike 6'  Shuttle DLP 3c 3x10      Patient Education and Home Exercises       Education provided:   - HEP    Written Home Exercises Provided: Yes. Exercises were reviewed and Ovidio was able to demonstrate them prior to the end of the session.  Ovidio demonstrated good  understanding of the education provided. See Electronic Medical Record under Patient Instructions for exercises provided during therapy sessions    Assessment     Pt tolerated first session after initial evaluation well today with no adverse effects. Session with emphasis on ankle strength and stability as well as gss tissue extensibility.     Ovidio Is progressing well towards his goals.   Patient prognosis: Good.   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     Plan of care discussed with patient: Yes  Patient's spiritual, cultural and educational needs considered and patient is agreeable to the plan of care and goals as stated below:      Anticipated Barriers for therapy: Time off of work    Goals:   Short Term Goals (3 Weeks):   1. Pt will be compliant with HEP to supplement PT in restoring pain free function.  2. Pt will improve impaired LE MMTs by 1/2 grade  to improve strength for functional tasks  3. Pt improve impaired ankle  ROM by 5-10 deg in all planes to improve mobility for normal movement patterns.   4. Pt will improve ankle swelling to equal circumference of R ankle to the L.      Long Term Goals (6 Weeks):  1. Pt will improve FOTO score to >/= 60 to decrease perceived limitation  with mobility  2. Pt will improve impaired LE MMTs by 1 grade to improve strength for functional tasks.  3. Pt improve impaired ankle ROM to WNL in all planes to improve mobility for normal movement patterns.   4. Pt will be able to walk without pain to return to leisure activities and work related tasks.     Plan     Plan of care Certification: 8/7/2024 to 10/7/2024.     Outpatient Physical Therapy 1-3 times weekly for 6 weeks to include the following interventions: Cervical/Lumbar Traction, Electrical Stimulation re-eval, dry needling, Gait Training, Iontophoresis (with ), Manual Therapy, Moist Heat/ Ice, Neuromuscular Re-ed, Patient Education, Self Care, Therapeutic Activities, and Therapeutic Exercise.     Andrew Castillo, PT

## 2024-08-20 ENCOUNTER — OFFICE VISIT (OUTPATIENT)
Dept: URGENT CARE | Facility: CLINIC | Age: 29
End: 2024-08-20
Payer: COMMERCIAL

## 2024-08-20 DIAGNOSIS — S93.402D MODERATE LEFT ANKLE SPRAIN, SUBSEQUENT ENCOUNTER: Primary | ICD-10-CM

## 2024-08-20 DIAGNOSIS — S86.012D STRAIN OF LEFT ACHILLES TENDON, SUBSEQUENT ENCOUNTER: ICD-10-CM

## 2024-08-20 DIAGNOSIS — Z02.6 ENCOUNTER RELATED TO WORKER'S COMPENSATION CLAIM: ICD-10-CM

## 2024-08-20 PROCEDURE — 99214 OFFICE O/P EST MOD 30 MIN: CPT | Mod: 95,,, | Performed by: SURGERY

## 2024-08-20 PROCEDURE — G2211 COMPLEX E/M VISIT ADD ON: HCPCS | Mod: 95,,, | Performed by: SURGERY

## 2024-08-20 NOTE — LETTER
[facility]  [address] [address]  Phone: [phone]  Fax: [phone]  [employer] [employer] [facility]: [id number]     Name: Ovidio Montero Jr.  Injury Date: 06/21/2024   Employee ID: [id number] Date of Treatment: 08/20/2024   Company: [employer]      Appointment Time: 09:00 AM Arrived: 9:00 AM    Provider: Nicole Jason MD Time Out:9:30 AM     Office Treatment:   1. Moderate left ankle sprain, subsequent encounter    2. Encounter related to worker's compensation claim    3. Strain of left Achilles tendon, subsequent encounter          Patient Instructions: Continue Physical Therapy      Restrictions: Sit or stand as needed, No Prolonged standing/walking     Return Appointment: 9/20/2024 at 9:00 AM Virtual

## 2024-08-20 NOTE — PROGRESS NOTES
Subjective:      Patient ID: Ovidio Montero Jr. is a 28 y.o. male.    Chief Complaint: No chief complaint on file.    The patient location is: [place]  The chief complaint leading to consultation is: No chief complaint on file.      Visit type: audiovisual    Face to Face time with patient: 10  I spent a total of 30 minutes on the day of the visit.    minutes of total time spent on the encounter, which includes face to face time and non-face to face time preparing to see the patient (eg, review of tests), Obtaining and/or reviewing separately obtained history, Documenting clinical information in the electronic or other health record, Independently interpreting results (not separately reported) and communicating results to the patient/family/caregiver, or Care coordination (not separately reported).     Each patient to whom he or she provides medical services by telemedicine is:  (1) informed of the relationship between the physician and patient and the respective role of any other health care provider with respect to management of the patient; and (2) notified that he or she may decline to receive medical services by telemedicine and may withdraw from such care at any time.    FOX Rollins MD note:  Weaned out of boot, is now using the brace. He still feels tightness at the back of ankle. 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     Objective:     Physical Exam  Vitals reviewed: Deferred due to telemedicine visit.   Constitutional:       General: He is not in acute distress.     Appearance: He is not ill-appearing.   HENT:      Head: Normocephalic.   Eyes:      Conjunctiva/sclera: Conjunctivae normal.   Pulmonary:      Effort: Pulmonary effort is normal. No respiratory distress.   Musculoskeletal:      Left ankle: No swelling. Decreased range of motion.      Left Achilles Tendon: Tenderness present.      Comments: Pain with flexing   Skin:     General: Skin is warm.      Coloration: Skin is  not pale.   Neurological:      General: No focal deficit present.      Mental Status: He is alert and oriented to person, place, and time.      GCS: GCS eye subscore is 4. GCS verbal subscore is 5. GCS motor subscore is 6.      Motor: Motor function is intact.      Coordination: Coordination is intact.   Psychiatric:         Attention and Perception: Attention normal.         Mood and Affect: Mood normal.         Speech: Speech normal.         Behavior: Behavior normal. Behavior is cooperative.         Thought Content: Thought content normal.        Assessment:      1. Moderate left ankle sprain, subsequent encounter    2. Encounter related to worker's compensation claim    3. Strain of left Achilles tendon, subsequent encounter      Plan:     I reviewed the PT notes related to this injury. He will continue PT, encouraged he remain consistent with treatment. Return to clinic in person for reassessment in 1 month, anticipate return to regular duty at that time. Okay to RTC sooner if feel able to return to regular duty prior.          Diagnoses and plan discussed with the patient, as well as the expected course and duration of symptoms. Risks and benefits of any medication prescribed during this visit was explained, verbal instructions on use given. Clinic/Emergency department return precautions were given, can return to [facility] before scheduled follow-up appointment if notes worsening/aggravation of symptoms. All questions and concerns were addressed prior to discharge. Plan was developed with active input from the patient and they verbalized understanding of and agreement with the POC.  C was informed of any referrals and relevant orders.  Note was dictated with voice recognition software, please excuse any grammatical errors.    I spent a total of 30 minutes on the day of the visit.  This includes face to face time and non-face to face time preparing to see the patient (e.g. review of medical record),  obtaining and/or reviewing separately obtained history, documenting clinical information in the electronic or other health record, independently interpreting results and communicating results to the patient/family/caregiver, or care coordinator.    Patient Instructions: Continue Physical Therapy   Restrictions: Sit or stand as needed, No Prolonged standing/walking  Follow up in about 1 month (around 9/20/2024).

## 2024-08-21 ENCOUNTER — PATIENT MESSAGE (OUTPATIENT)
Dept: URGENT CARE | Facility: CLINIC | Age: 29
End: 2024-08-21
Payer: COMMERCIAL

## 2024-08-22 ENCOUNTER — TELEPHONE (OUTPATIENT)
Dept: URGENT CARE | Facility: CLINIC | Age: 29
End: 2024-08-22
Payer: COMMERCIAL

## 2024-08-22 DIAGNOSIS — S93.402D MODERATE LEFT ANKLE SPRAIN, SUBSEQUENT ENCOUNTER: Primary | ICD-10-CM

## 2024-08-22 DIAGNOSIS — Z02.6 ENCOUNTER RELATED TO WORKER'S COMPENSATION CLAIM: ICD-10-CM

## 2024-08-22 DIAGNOSIS — S86.012D STRAIN OF LEFT ACHILLES TENDON, SUBSEQUENT ENCOUNTER: ICD-10-CM

## 2024-08-22 NOTE — LETTER
[facility]  [address] [address]  Phone: [phone]  Fax: [phone]  [facility] [facility]: [id number]    Pt Name: Ovidio Montero Jr. Injury Date:  6/21/2024   Employee ID: [id number] Date of Treatment: 08/20/2024   Company: [employer] Telephone Encounter: 8/22/2024     Appointment Time: 09:00 AM Arrived: 9:00 AM    Provider: Nicole Jason MD Time Out:9:30 AM     Office Treatment:   1. Moderate left ankle sprain, subsequent encounter    2. Encounter related to worker's compensation claim    3. Strain of left Achilles tendon, subsequent encounter             Patient Instructions: Continue Physical Therapy, Use splint as directed (Work with PT to wean off use of ankle brace)      Restrictions: Sit or stand as needed, No Prolonged standing/walking, Sit down work only (No prolonged walking greater than 30 yards at a time; allow use of ankle brace while working)              Return Appointment: 9/20/2024 at 9:00 AM at [facility]

## 2024-08-22 NOTE — TELEPHONE ENCOUNTER
Mr. Montero called due to issues with work restrictions. 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 He has been out of PT due to transportation issues but says he will have that issue resolved by tomorrow to be able to attend PT twice a week. I advised that he talk to PT and WC if he is having transportation issues as they may be able to provide assistance to ensure he consistently attends PT appointments.     [facility] is updated to include more specific restrictions and sit down work only. He will keep 9/20/24 follow-up appointment at [place].

## 2024-08-22 NOTE — LETTER
[facility]  [address] [address]  Phone: [phone]  Fax: [phone]  [facility] [facility]: [id number]    Pt Name: Ovidio Montero Jr.  Injury Date:     Employee ID: [id number] Date of Treatment: 08/20/2024   Company: [employer]      Appointment Time: 09:00 AM Arrived: 9:00 AM    Provider: Nicole Jason MD Time Out:9:30 AM     Office Treatment:   1. Moderate left ankle sprain, subsequent encounter    2. Encounter related to worker's compensation claim    3. Strain of left Achilles tendon, subsequent encounter                Patient Instructions: Continue Physical Therapy, Use splint as directed (Work with PT to wean off use of ankle brace)      Restrictions: Sit or stand as needed, No Prolonged standing/walking, Sit down work only (No prolonged walking greater than 30 yards at a time; allow use of ankle brace while working)            Return Appointment: 9/20/2024 at 9:00 AM at [facility]

## 2024-08-23 ENCOUNTER — CLINICAL SUPPORT (OUTPATIENT)
Dept: REHABILITATION | Facility: HOSPITAL | Age: 29
End: 2024-08-23
Payer: COMMERCIAL

## 2024-08-23 DIAGNOSIS — M25.672 DECREASED RANGE OF MOTION OF LEFT ANKLE: ICD-10-CM

## 2024-08-23 DIAGNOSIS — R29.898 ANKLE WEAKNESS: ICD-10-CM

## 2024-08-23 DIAGNOSIS — M25.572 ACUTE LEFT ANKLE PAIN: Primary | ICD-10-CM

## 2024-08-23 PROCEDURE — 97110 THERAPEUTIC EXERCISES: CPT

## 2024-08-23 PROCEDURE — 97112 NEUROMUSCULAR REEDUCATION: CPT

## 2024-08-23 PROCEDURE — 97530 THERAPEUTIC ACTIVITIES: CPT

## 2024-08-23 NOTE — PROGRESS NOTES
[facility]   Physical Therapy Treatment Note      Name: Ovidio Montero Jr.  Clinic Number: [id number]    Therapy Diagnosis:   Encounter Diagnoses   Name Primary?    Acute left ankle pain Yes    Decreased range of motion of left ankle     Ankle weakness        Physician: Nicole Jason MD    Visit Date: 2024    Physician Orders: PT Eval and Treat   Medical Diagnosis from Referral:   Z02.6 (ICD-10-CM) - Encounter related to worker's compensation claim   S93.402D (ICD-10-CM) - Moderate left ankle sprain, subsequent encounter   S86.012D (ICD-10-CM) - Strain of left Achilles tendon, subsequent encounter   Evaluation Date: 2024  Authorization Period Expiration: 2025  Plan of Care Expiration: 10/7/2024  Visit # / Visits authorized: 3/12     Foto  Date  Score    #1/3  2024  47%   #2/3       #3/3          Time In: 700  Time Out: 745  Total Appointment Time (timed & untimed codes): 45 minutes    PTA Visit #: 0/5       Subjective     Patient reports: he is feeling better overall. Patient is on light duty now and not walking as much as he was.   He was home exercise program.  Response to previous treatment: last session was IE  Functional change: none reported    Pain: 0/10  Location: left ankles     Objective      Objective Measures updated at progress report unless specified.     Treatment     Ovidio received the treatments listed below:      therapeutic exercises to develop strength, endurance, ROM, flexibility, posture, and core stabilization for 10 minutes includin way ankle 2x10 each RTB  Standing gastroc stretch x2'   Standing soleus stretch x2'      neuromuscular re-education activities to improve: Balance, Coordination, Proprioception, and Posture for 10 minutes. The following activities were included:    SLS:   -  3x20s  Tandem stance on airex 2x20s B      therapeutic activities to improve functional performance for 25  minutes, including:    Calf  raises 3x10  2 up 1 down calf raises 3x10   Tib push 3x10  Upright bike 6'  Shuttle DLP 6.5c 3x10  Shuttle double calf raises   - 5c 3x10       Patient Education and Home Exercises       Education provided:   - HEP    Written Home Exercises Provided: Yes. Exercises were reviewed and Ovidio was able to demonstrate them prior to the end of the session.  Ovidio demonstrated good  understanding of the education provided. See Electronic Medical Record under Patient Instructions for exercises provided during therapy sessions    Assessment     Patient continues to respond well to therapy session. Patient was progressed with TA today without adverse effects. Patient received additional HEP. Cont to progress as tolerated.         Ovidio Is progressing well towards his goals.   Patient prognosis: Good.   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     Plan of care discussed with patient: Yes  Patient's spiritual, cultural and educational needs considered and patient is agreeable to the plan of care and goals as stated below:      Anticipated Barriers for therapy: Time off of work    Goals:   Short Term Goals (3 Weeks):   1. Pt will be compliant with HEP to supplement PT in restoring pain free function.  2. Pt will improve impaired LE MMTs by 1/2 grade  to improve strength for functional tasks  3. Pt improve impaired ankle  ROM by 5-10 deg in all planes to improve mobility for normal movement patterns.   4. Pt will improve ankle swelling to equal circumference of R ankle to the L.      Long Term Goals (6 Weeks):  1. Pt will improve FOTO score to >/= 60 to decrease perceived limitation with mobility  2. Pt will improve impaired LE MMTs by 1 grade to improve  strength for functional tasks.  3. Pt improve impaired ankle ROM to WNL in all planes to improve mobility for normal movement patterns.   4. Pt will be able to walk without pain to return to leisure activities and work related tasks.     Plan     Plan of care Certification: 8/7/2024 to 10/7/2024.     Outpatient Physical Therapy 1-3 times weekly for 6 weeks to include the following interventions: Cervical/Lumbar Traction, Electrical Stimulation re-eval, dry needling, Gait Training, Iontophoresis (with ), Manual Therapy, Moist Heat/ Ice, Neuromuscular Re-ed, Patient Education, Self Care, Therapeutic Activities, and Therapeutic Exercise.     Ovidio Figueredo, PT

## 2024-08-30 ENCOUNTER — CLINICAL SUPPORT (OUTPATIENT)
Dept: REHABILITATION | Facility: HOSPITAL | Age: 29
End: 2024-08-30
Payer: COMMERCIAL

## 2024-08-30 DIAGNOSIS — R29.898 ANKLE WEAKNESS: ICD-10-CM

## 2024-08-30 DIAGNOSIS — M25.572 ACUTE LEFT ANKLE PAIN: Primary | ICD-10-CM

## 2024-08-30 DIAGNOSIS — M25.672 DECREASED RANGE OF MOTION OF LEFT ANKLE: ICD-10-CM

## 2024-08-30 PROCEDURE — 97530 THERAPEUTIC ACTIVITIES: CPT

## 2024-08-30 PROCEDURE — 97110 THERAPEUTIC EXERCISES: CPT

## 2024-08-30 NOTE — PROGRESS NOTES
[facility]   Physical Therapy Treatment Note      Name: Ovidio Montero Jr.  Clinic Number: [id number]    Therapy Diagnosis:   Encounter Diagnoses   Name Primary?    Acute left ankle pain Yes    Decreased range of motion of left ankle     Ankle weakness        Physician: Nicole Jason MD    Visit Date: 2024    Physician Orders: PT Eval and Treat   Medical Diagnosis from Referral:   Z02.6 (ICD-10-CM) - Encounter related to worker's compensation claim   S93.402D (ICD-10-CM) - Moderate left ankle sprain, subsequent encounter   S86.012D (ICD-10-CM) - Strain of left Achilles tendon, subsequent encounter   Evaluation Date: 2024  Authorization Period Expiration: 2025  Plan of Care Expiration: 10/7/2024  Visit # / Visits authorized:      Foto  Date  Score    #1/3  2024  47%   #2/3       #3/3          Time In: 700  Time Out: 732  Total Appointment Time (timed & untimed codes): 32 minutes    PTA Visit #: 0/5       Subjective     Patient reports: he is feeling better overall. Patient is on light duty now and not walking as much as he was. Patient has to leave early today due to work   He was home exercise program.  Response to previous treatment: last session was IE  Functional change: none reported    Pain: 0/10  Location: left ankles     Objective      Objective Measures updated at progress report unless specified.     Treatment     Ovidio received the treatments listed below:      therapeutic exercises to develop strength, endurance, ROM, flexibility, posture, and core stabilization for 8 minutes includin way ankle 2x10 each RTB  Standing gastroc stretch x2'   Standing soleus stretch x2'      neuromuscular re-education activities to improve: Balance, Coordination, Proprioception, and Posture for 0 minutes. The following activities were included:    SLS:   -  3x20s  Tandem stance on airex 2x20s B      therapeutic activities to improve functional  performance for 24  minutes, including:      Upright bike 6'  Calf raises 3x10    - 15x w/ 10lb weight in each hand  2 up 1 down calf raises 3x10   Tib push 3x10 NT   Shuttle DLP 6.5c 3x10  Shuttle SL press 3.5c 3x10  Shuttle double calf raises   - 5c 3x10       Patient Education and Home Exercises       Education provided:   - HEP    Written Home Exercises Provided: Yes. Exercises were reviewed and Ovidio was able to demonstrate them prior to the end of the session.  Ovidio demonstrated good  understanding of the education provided. See Electronic Medical Record under Patient Instructions for exercises provided during therapy sessions    Assessment     Patient continues to respond well to therapy session. Patient was progressed with TA today without adverse effects. Patient received additional HEP. Cont to progress as tolerated.         Ovidio Is progressing well towards his goals.   Patient prognosis: Good.   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     Plan of care discussed with patient: Yes  Patient's spiritual, cultural and educational needs considered and patient is agreeable to the plan of care and goals as stated below:      Anticipated Barriers for therapy: Time off of work    Goals:   Short Term Goals (3 Weeks):   1. Pt will be compliant with HEP to supplement PT in restoring pain free function.  2. Pt will improve impaired LE MMTs by 1/2 grade  to improve strength for functional tasks  3. Pt improve impaired ankle  ROM by 5-10 deg in all planes to improve mobility for normal movement patterns.   4. Pt will improve ankle swelling to equal circumference of R ankle to the L.      Long Term Goals (6 Weeks):  1. Pt will improve FOTO score  to >/= 60 to decrease perceived limitation with mobility  2. Pt will improve impaired LE MMTs by 1 grade to improve strength for functional tasks.  3. Pt improve impaired ankle ROM to WNL in all planes to improve mobility for normal movement patterns.   4. Pt will be able to walk without pain to return to leisure activities and work related tasks.     Plan     Plan of care Certification: 8/7/2024 to 10/7/2024.     Outpatient Physical Therapy 1-3 times weekly for 6 weeks to include the following interventions: Cervical/Lumbar Traction, Electrical Stimulation re-eval, dry needling, Gait Training, Iontophoresis (with ), Manual Therapy, Moist Heat/ Ice, Neuromuscular Re-ed, Patient Education, Self Care, Therapeutic Activities, and Therapeutic Exercise.     Ovidio Figueredo, PT

## 2024-09-27 ENCOUNTER — OFFICE VISIT (OUTPATIENT)
Dept: URGENT CARE | Facility: CLINIC | Age: 29
End: 2024-09-27
Payer: COMMERCIAL

## 2024-09-27 VITALS
WEIGHT: 315 LBS | HEART RATE: 102 BPM | SYSTOLIC BLOOD PRESSURE: 185 MMHG | HEIGHT: 68 IN | DIASTOLIC BLOOD PRESSURE: 129 MMHG | OXYGEN SATURATION: 97 % | BODY MASS INDEX: 47.74 KG/M2 | RESPIRATION RATE: 20 BRPM

## 2024-09-27 DIAGNOSIS — Z02.6 ENCOUNTER RELATED TO WORKER'S COMPENSATION CLAIM: ICD-10-CM

## 2024-09-27 DIAGNOSIS — S93.402D MODERATE LEFT ANKLE SPRAIN, SUBSEQUENT ENCOUNTER: Primary | ICD-10-CM

## 2024-09-27 DIAGNOSIS — S86.012D STRAIN OF LEFT ACHILLES TENDON, SUBSEQUENT ENCOUNTER: ICD-10-CM

## 2024-09-27 RX ORDER — SEMAGLUTIDE 1.34 MG/ML
1 INJECTION, SOLUTION SUBCUTANEOUS
COMMUNITY
Start: 2024-03-12

## 2024-09-27 NOTE — PROGRESS NOTES
Subjective:      Patient ID: Ovidio Montero Jr. is a 28 y.o. male.    Chief Complaint: Foot Injury (LT Foot/Ankle)    Patient's place of employment - [employer]  Patient's job title - [employer]  Date of Injury - 06-21-24  Body part injured - LT Foot/Ankle  Current work status per last visit - Sit or stand as needed, No Prolonged standing/walking  Improved, same, or worse - Improved  Pain Scale right now (1-10) -  0/10    Foot Injury   Pertinent negatives include no numbness.       Musculoskeletal:  Negative for pain, trauma, joint pain, joint swelling, abnormal ROM of joint, pain with walking, muscle cramps and muscle ache.   Skin:  Negative for erythema.   Neurological:  Negative for numbness and tingling.       See MA note above. Begin MD note:    Ovidio Montero Jr. is a 28 y.o. presenting for follow-up of left ankle injury. He has completed 3 PT sessions, does not feel that he needs to continue attending as he is pain free. He is not currently using any medications to address the pain.   He feels able to return to regular duty without restrictions.   He ask if they can do PT on his knee.     He says that he has appointment to establish care with a PCP on 10/8.    Objective:     Physical Exam  Constitutional:       General: He is not in acute distress.     Appearance: Normal appearance. He is morbidly obese.   HENT:      Right Ear: External ear normal.      Left Ear: External ear normal.   Eyes:      Conjunctiva/sclera: Conjunctivae normal.   Cardiovascular:      Pulses: Normal pulses.   Pulmonary:      Effort: Pulmonary effort is normal.   Musculoskeletal:         General: No swelling, tenderness or deformity.      Left ankle: No swelling, deformity or ecchymosis. No lateral malleolus, medial malleolus, ATF ligament or AITF ligament tenderness. Normal range of motion. Anterior drawer test negative. Normal pulse.      Left Achilles Tendon: No tenderness or defects. Luz's test  negative.      Comments: Wearing flip-flops. NTTP.  No defect.  Full range of motion. Normal gait.    Skin:     Capillary Refill: Capillary refill takes less than 2 seconds.      Findings: No bruising or erythema.   Neurological:      General: No focal deficit present.      Mental Status: He is alert and oriented to person, place, and time.   Psychiatric:         Mood and Affect: Mood normal.         Behavior: Behavior normal.         Thought Content: Thought content normal.         Judgment: Judgment normal.          Assessment:      1. Moderate left ankle sprain, subsequent encounter    2. Encounter related to worker's compensation claim    3. Strain of left Achilles tendon, subsequent encounter      Plan:     I reviewed the PT notes related to this injury, has not met any treatment goals. I advised that he continue PT to help strengthen and protect ankle from future injury. If he wants to be seen regarding the knee, he was instructed to reach out to his WC adjustor to inform them of his desire to seek care about his other claim. Regular duty.     Provided education on ED precautions for an elevated BP. Advised to remain consistent on any medication prescribed to address his BP.        Diagnoses and plan discussed with the patient, all questions and concerns were addressed prior to discharge. Follow-up as needed if any reoccurrence of symptoms related to the present condition. Plan was developed with active input from the patient and they verbalized understanding of and agreement with the POC.   Note was dictated with voice recognition software, please excuse any grammatical errors.    I spent a total of 30 minutes on the day of the visit.  This includes face to face time and non-face to face time preparing to see the patient (eg, review of tests), obtaining and/or reviewing separately obtained history, documenting clinical information in the electronic or other health record, independently interpreting results  and communicating results to the patient/family/caregiver, or care coordinator.    Patient Instructions: Daily home exercises/warm soaks, Continue Physical Therapy   Restrictions: Regular Duty  Follow up if symptoms worsen or fail to improve.

## 2024-09-27 NOTE — LETTER
[facility]  [address] [facility] [address]  Phone: [phone]  Fax: [phone]  [employer] [employer] [facility]: [id number]    Pt Name: Ovidio Montero Jr.  Injury Date: 06/21/2024   Employee ID: [id number] Date of First Treatment: 09/27/2024   Company: [employer]      Appointment Time: 01:15 PM Arrived: 1:14 pm   Provider: Nicole Jason MD Time Out:2:45 pm     Office Treatment:   1. Moderate left ankle sprain, subsequent encounter    2. Encounter related to worker's compensation claim    3. Strain of left Achilles tendon, subsequent encounter          Patient Instructions: Daily home exercises/warm soaks, Continue Physical Therapy    Restrictions: Regular Duty     Return Appointment: if symptoms worsen or fail to improve

## 2024-10-10 ENCOUNTER — HOSPITAL ENCOUNTER (EMERGENCY)
Facility: HOSPITAL | Age: 29
Discharge: HOME | End: 2024-10-10
Attending: EMERGENCY MEDICINE
Payer: MEDICAID

## 2024-10-10 VITALS
WEIGHT: 315 LBS | HEART RATE: 93 BPM | HEIGHT: 68 IN | RESPIRATION RATE: 20 BRPM | OXYGEN SATURATION: 98 % | DIASTOLIC BLOOD PRESSURE: 115 MMHG | BODY MASS INDEX: 47.74 KG/M2 | SYSTOLIC BLOOD PRESSURE: 182 MMHG | TEMPERATURE: 99 F

## 2024-10-10 DIAGNOSIS — I10 ASYMPTOMATIC HYPERTENSION: Primary | ICD-10-CM

## 2024-10-10 LAB
ALBUMIN SERPL BCP-MCNC: 4.1 G/DL (ref 3.5–5.2)
ALP SERPL-CCNC: 60 U/L (ref 55–135)
ALT SERPL W/O P-5'-P-CCNC: 50 U/L (ref 10–44)
ANION GAP SERPL CALC-SCNC: 13 MMOL/L (ref 8–16)
AST SERPL-CCNC: 44 U/L (ref 10–40)
BILIRUB SERPL-MCNC: 0.2 MG/DL (ref 0.1–1)
BUN SERPL-MCNC: 14 MG/DL (ref 6–20)
CALCIUM SERPL-MCNC: 10.1 MG/DL (ref 8.7–10.5)
CHLORIDE SERPL-SCNC: 104 MMOL/L (ref 95–110)
CO2 SERPL-SCNC: 24 MMOL/L (ref 23–29)
CREAT SERPL-MCNC: 0.9 MG/DL (ref 0.5–1.4)
EST. GFR  (NO RACE VARIABLE): >60 ML/MIN/1.73 M^2
GLUCOSE SERPL-MCNC: 94 MG/DL (ref 70–110)
POTASSIUM SERPL-SCNC: 3.8 MMOL/L (ref 3.5–5.1)
PROT SERPL-MCNC: 8.1 G/DL (ref 6–8.4)
SODIUM SERPL-SCNC: 141 MMOL/L (ref 136–145)

## 2024-10-10 PROCEDURE — 80053 COMPREHEN METABOLIC PANEL: CPT | Performed by: PHYSICIAN ASSISTANT

## 2024-10-10 PROCEDURE — 25000003 PHARM REV CODE 250: Performed by: EMERGENCY MEDICINE

## 2024-10-10 PROCEDURE — 99284 EMERGENCY DEPT VISIT MOD MDM: CPT

## 2024-10-10 RX ORDER — LOSARTAN POTASSIUM 100 MG/1
150 TABLET ORAL DAILY
Qty: 135 TABLET | Refills: 0 | Status: SHIPPED | OUTPATIENT
Start: 2024-10-10 | End: 2025-01-08

## 2024-10-10 RX ORDER — AMLODIPINE BESYLATE 5 MG/1
5 TABLET ORAL DAILY
Qty: 90 TABLET | Refills: 0 | Status: SHIPPED | OUTPATIENT
Start: 2024-10-10 | End: 2025-10-10

## 2024-10-10 RX ORDER — LOSARTAN POTASSIUM 50 MG/1
150 TABLET ORAL
Status: COMPLETED | OUTPATIENT
Start: 2024-10-10 | End: 2024-10-10

## 2024-10-10 RX ORDER — AMLODIPINE BESYLATE 5 MG/1
5 TABLET ORAL
Status: COMPLETED | OUTPATIENT
Start: 2024-10-10 | End: 2024-10-10

## 2024-10-10 RX ADMIN — LOSARTAN POTASSIUM 150 MG: 50 TABLET, FILM COATED ORAL at 07:10

## 2024-10-10 RX ADMIN — AMLODIPINE BESYLATE 5 MG: 5 TABLET ORAL at 07:10

## 2024-10-10 NOTE — Clinical Note
"Ovidio Parkscott Montero was seen and treated in our emergency department on 10/10/2024.  He may return to work on 10/14/2024.       If you have any questions or concerns, please don't hesitate to call.      Gretchen Castellanos PA-C"

## 2024-10-10 NOTE — ED TRIAGE NOTES
Pt c/o hypertension. States he can't get in with a PCP so he is out of his medications for about 3 months. Losartan and amlodipine. States otherwise feeling well.

## 2024-10-11 NOTE — DISCHARGE INSTRUCTIONS
It was nice meeting you, and I hope you feel better soon. You may return to the ER at any time for any new or concerning symptoms, worsening condition, or failure to improve.    Our goal in the ER is to always give you outstanding care and exceptional service. You may receive a survey by mail or email in the next week about your experience in our ED. We would greatly appreciate you completing and returning the survey. Your feedback provides us with a way to recognize our staff who give very good care and it helps us learn how to improve when your experience was below our aspiration of excellence.     Sincerely,     Gretchen Castellanos PA-C  Emergency Room Physician Assistant  [facility] [employer]

## 2024-10-12 NOTE — ED PROVIDER NOTES
Encounter Date: 10/10/2024       History     Chief Complaint   Patient presents with    Hypertension     [employer] employee, presents to triage c/o HTN, has not been taking prescribed medication bc he hasn't been able to get in with PCP, denies CP/HA/dizziness, +diaphoretic.      Ovidio Montero Jr. is a 28 y.o. male who has a past medical history of prediabetes, hypertension, hyperlipidemia, insulin resistance presenting to the Emergency Department for hypertension.  Patient reports he has been out of his medications for about 3 months he has been lost follow up.  He does sometimes feel lightheaded when his pressure is elevated.  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  Reports he averages 17,000 steps a day.  He does sweat heavily with exertion, sometimes gets short winded but improves with rest. Denies these symptoms at this time, though was reportedly diaphoretic in triage. No leg swelling. Never any chest pain. No headache, vision changes, dizziness, lightheadedness.  He has tried to establish care with Dr. Simpson however appointments are scheduled months out in advance at this point. Reports he otherwise feels well and is without complaint.       The history is provided by the patient.     Review of patient's allergies indicates:   Allergen Reactions    Penicillins      Past Medical History:   Diagnosis Date    Acquired acanthosis nigricans     ADHD (attention deficit hyperactivity disorder)     Dr. Champagne    Anxiety     Diabetes mellitus     HTN (hypertension)     Hyperlipidemia, mixed     Hypertension     Insulin resistance     Morbid obesity     Seasonal allergic rhinitis     Sleep disturbance     Dr. Champagne, counseling    Tic disorder     Vision abnormalities     Myopia & Astigmatism     Past Surgical History:   Procedure Laterality Date    CIRCUMCISION, PRIMARY       Family History   Problem Relation Name Age of Onset    Obesity Mother Ena     Anxiety disorder Mother Ena      Depression Father      Mental illness Father      Depression Paternal Aunt x1     Mental illness Paternal Aunt x1     Depression Maternal Grandmother      Bipolar disorder Maternal Grandmother      Leukemia Maternal Grandfather          Adult Leukemia - in Remission    Depression Paternal Grandfather      Suicidality Paternal Grandfather      Early death Paternal Grandfather      Cancer Other Mat DZ     Depression Other Mat DZ     Bipolar disorder Other Mat DZ     Mental illness Other Mat DZ     Stroke Other Mat DZ     Hypertension Other Mat DZ     Depression Other Pat DZ     Bipolar disorder Other Pat DZ     Mental illness Other Pat DZ      Social History     Tobacco Use    Smoking status: Never     Passive exposure: Yes    Smokeless tobacco: Never   Substance Use Topics    Alcohol use: No    Drug use: No     Review of Systems   Constitutional:  Positive for diaphoresis. Negative for chills and fever.   Eyes:  Negative for visual disturbance.   Respiratory:  Negative for shortness of breath.    Cardiovascular:  Negative for chest pain.   Gastrointestinal:  Negative for abdominal pain, diarrhea, nausea and vomiting.   Genitourinary:  Negative for dysuria.   Neurological:  Negative for dizziness, light-headedness and headaches.   Psychiatric/Behavioral:  Negative for agitation and confusion.        Physical Exam     Initial Vitals   BP Pulse Resp Temp SpO2   10/10/24 1701 10/10/24 1702 10/10/24 1702 10/10/24 1702 10/10/24 1702   (!) 220/122 105 18 98.9 °F (37.2 °C) 97 %      MAP       --                Physical Exam    Nursing note and vitals reviewed.  Constitutional: He appears well-developed and well-nourished. He is not diaphoretic. He is Obese . He is cooperative.  Non-toxic appearance. No distress.   Very pleasant. Non toxic and in no distress   HENT:   Head: Normocephalic and atraumatic.   Right Ear: External ear normal.   Left Ear: External ear normal.   Eyes: EOM are normal.   Neck: Neck supple.   Normal  range of motion.  Cardiovascular:  Normal rate and regular rhythm.           Pulmonary/Chest: Breath sounds normal. No respiratory distress. He has no wheezes. He has no rales.   Abdominal: He exhibits no distension.   Musculoskeletal:         General: Normal range of motion.      Cervical back: Normal range of motion and neck supple.     Neurological: He is alert and oriented to person, place, and time. GCS score is 15. GCS eye subscore is 4. GCS verbal subscore is 5. GCS motor subscore is 6.   Skin: Skin is dry.   Psychiatric: He has a normal mood and affect. His behavior is normal. Judgment and thought content normal.         ED Course   Procedures  Labs Reviewed   COMPREHENSIVE METABOLIC PANEL - Abnormal       Result Value    Sodium 141      Potassium 3.8      Chloride 104      CO2 24      Glucose 94      BUN 14      Creatinine 0.9      Calcium 10.1      Total Protein 8.1      Albumin 4.1      Total Bilirubin 0.2      Alkaline Phosphatase 60      AST 44 (*)     ALT 50 (*)     eGFR >60      Anion Gap 13            Imaging Results    None          Medications   amLODIPine tablet 5 mg (5 mg Oral Given 10/10/24 1954)   losartan tablet 150 mg (150 mg Oral Given 10/10/24 1954)     Medical Decision Making  Patient with asymptomatic elevated blood pressure secondary to chronic uncontrolled HTN. The patient's symptoms are not consistent with SAH/intracranial bleed. Physical exam is benign without focal weakness, sensory deficit, or cerebellar dysfunction to suggest acute stroke or intracranial mass. There is no meningismus, fever, or other evidence of infection to suggest meningitis/encephalitis. Chemistry was obtained and reveals normal renal function, electrolytes, glucose. There is no other evidence of end-organ damage consistent with hypertensive urgency/emergency at time of this evaluation.  I did treat patient with his prescribed home medications and his blood pressure began to improve.  I have refilled his blood  pressure medications.  Referral placed to help placement establish care with PCP. I do not feel further ED evaluation or intervention is warranted. The patient was counseled extensively on medication compliance and was instructed to follow-up with a PCP for further management of elevated BP.     Differential Diagnosis includes, but is not limited to:  Hypertensive encephalopathy, CVA/TIA, intracranial hemorrhage, MI/ACS, aortic/carotid artery dissection, AAA, hypertensive nephropathy, medication non-compliance, anxiety, drug abuse/intoxication, essential hypertension      Problems Addressed:  Asymptomatic hypertension: acute illness or injury    Amount and/or Complexity of Data Reviewed  Labs:  Decision-making details documented in ED Course.    Risk  Prescription drug management.               ED Course as of 10/12/24 0203   Thu Oct 10, 2024   2040 Creatinine: 0.9 [CS]      ED Course User Index  [CS] Gretchen Castellanos PA-C                           Clinical Impression:  Final diagnoses:  [I10] Asymptomatic hypertension (Primary)          ED Disposition Condition    Discharge Stable          ED Prescriptions       Medication Sig Dispense Start Date End Date Auth. Provider    amLODIPine (NORVASC) 5 MG tablet Take 1 tablet (5 mg total) by mouth once daily. 90 tablet 10/10/2024 10/10/2025 Gretchen Castellanos PA-C    losartan (COZAAR) 100 MG tablet Take 1.5 tablets (150 mg total) by mouth once daily. 135 tablet 10/10/2024 1/8/2025 Gretchen Castellanos PA-C          Follow-up Information       Follow up With Specialties Details Why Contact Info Additional Information    Naty Simpson MD Family Medicine Schedule an appointment as soon as possible for a visit   [address] [facility] [address]  [phone]       [facility] Family [facility] Family Medicine Schedule an appointment as soon as possible for a visit in 1 week  [address], [address]  [facility] [address] [phone] Please park in  L[address] or D and use [address]. Take Medical Office Bldg. elevators.             Gretchen Castelalnos PA-C  10/12/24 1265

## 2024-12-15 ENCOUNTER — HOSPITAL ENCOUNTER (EMERGENCY)
Facility: HOSPITAL | Age: 29
Discharge: HOME | End: 2024-12-15
Attending: EMERGENCY MEDICINE
Payer: MEDICAID

## 2024-12-15 VITALS
TEMPERATURE: 99 F | HEIGHT: 68 IN | SYSTOLIC BLOOD PRESSURE: 205 MMHG | OXYGEN SATURATION: 95 % | BODY MASS INDEX: 47.74 KG/M2 | HEART RATE: 109 BPM | RESPIRATION RATE: 18 BRPM | WEIGHT: 315 LBS | DIASTOLIC BLOOD PRESSURE: 121 MMHG

## 2024-12-15 DIAGNOSIS — J00 ACUTE RHINITIS: Primary | ICD-10-CM

## 2024-12-15 DIAGNOSIS — R04.0 EPISTAXIS: ICD-10-CM

## 2024-12-15 LAB
INFLUENZA A, MOLECULAR: NEGATIVE
INFLUENZA B, MOLECULAR: NEGATIVE
SARS-COV-2 RDRP RESP QL NAA+PROBE: NEGATIVE
SPECIMEN SOURCE: NORMAL

## 2024-12-15 PROCEDURE — 87635 SARS-COV-2 COVID-19 AMP PRB: CPT | Performed by: EMERGENCY MEDICINE

## 2024-12-15 PROCEDURE — 99283 EMERGENCY DEPT VISIT LOW MDM: CPT

## 2024-12-15 PROCEDURE — 87502 INFLUENZA DNA AMP PROBE: CPT | Performed by: EMERGENCY MEDICINE

## 2024-12-15 RX ORDER — OXYMETAZOLINE HCL 0.05 %
1 SPRAY, NON-AEROSOL (ML) NASAL 2 TIMES DAILY
Qty: 15 ML | Refills: 0 | Status: SHIPPED | OUTPATIENT
Start: 2024-12-15 | End: 2024-12-18

## 2024-12-15 NOTE — ED PROVIDER NOTES
Encounter Date: 12/15/2024       History     Chief Complaint   Patient presents with    Nasal Congestion     States past 2 days has had URI symptoms. And now has blood in nasal mucous.      HPI  30 yo M w/ h/o presents w/ complaint of blood in his mucus while sneezing.  He denies epistaxis otherwise.  He has not been coughing up any blood.  Reports that he has had upper respiratory symptoms to include congestion and occasional cough with frequent sneezing for the last several days.  He is not anticoagulated.  He has a history of hypertension for which he takes amlodipine.  He denies anticoagulation or history of blood dyscrasia.  Review of patient's allergies indicates:   Allergen Reactions    Penicillins      Past Medical History:   Diagnosis Date    Acquired acanthosis nigricans     ADHD (attention deficit hyperactivity disorder)     Dr. Champagne    Anxiety     Diabetes mellitus     HTN (hypertension)     Hyperlipidemia, mixed     Hypertension     Insulin resistance     Morbid obesity     Seasonal allergic rhinitis     Sleep disturbance     Dr. Champagne, counseling    Tic disorder     Vision abnormalities     Myopia & Astigmatism     Past Surgical History:   Procedure Laterality Date    CIRCUMCISION, PRIMARY       Family History   Problem Relation Name Age of Onset    Obesity Mother Ena     Anxiety disorder Mother Ena     Depression Father      Mental illness Father      Depression Paternal Aunt x1     Mental illness Paternal Aunt x1     Depression Maternal Grandmother      Bipolar disorder Maternal Grandmother      Leukemia Maternal Grandfather          Adult Leukemia - in Remission    Depression Paternal Grandfather      Suicidality Paternal Grandfather      Early death Paternal Grandfather      Cancer Other Mat DZ     Depression Other Mat DZ     Bipolar disorder Other Mat DZ     Mental illness Other Mat DZ     Stroke Other Mat DZ     Hypertension Other Mat DZ     Depression Other Pat DZ     Bipolar disorder  Other Pat DZ     Mental illness Other Pat DZ      Social History     Tobacco Use    Smoking status: Never     Passive exposure: Yes    Smokeless tobacco: Never   Substance Use Topics    Alcohol use: No    Drug use: No     Review of Systems    Physical Exam     Initial Vitals [12/15/24 1010]   BP Pulse Resp Temp SpO2   (!) 205/121 109 18 98.9 °F (37.2 °C) 95 %      MAP       --         Physical Exam    Nursing note and vitals reviewed.  Constitutional: He appears well-developed and well-nourished.   HENT:   Head: Normocephalic and atraumatic.   Dried Blood at left nare   Eyes: Conjunctivae and EOM are normal. Pupils are equal, round, and reactive to light.   Neck: Neck supple.   Normal range of motion.  Cardiovascular:  Normal rate, regular rhythm and intact distal pulses.           Pulmonary/Chest: Breath sounds normal. No respiratory distress. He exhibits no tenderness.   Abdominal: Abdomen is soft. He exhibits no distension. There is no rebound.   Musculoskeletal:         General: No tenderness or edema. Normal range of motion.      Cervical back: Normal range of motion and neck supple.     Neurological: He is alert and oriented to person, place, and time. He has normal strength. No cranial nerve deficit or sensory deficit. GCS score is 15. GCS eye subscore is 4. GCS verbal subscore is 5. GCS motor subscore is 6.   Skin: Skin is warm and dry. Capillary refill takes less than 2 seconds.   Psychiatric: He has a normal mood and affect.         ED Course   Procedures  Labs Reviewed   INFLUENZA A & B BY MOLECULAR       Result Value    Influenza A, Molecular Negative      Influenza B, Molecular Negative      Flu A & B Source Nasal swab     SARS-COV-2 RNA AMPLIFICATION, QUAL    SARS-CoV-2 RNA, Amplification, Qual Negative            Imaging Results    None          Medications - No data to display  Medical Decision Making  Presentation is consistent with brief episode of epistaxis due to irritation of nasal mucosa in the  setting of viral rhinitis.  No active bleeding.  No evidence of hemodynamic instability.  Asymptomatic hypertension noted.  Patient referred back to primary care for recheck of blood pressure and possible adjusting medication.  No signs and symptoms of significant blood loss.    Amount and/or Complexity of Data Reviewed  External Data Reviewed: labs, radiology, ECG and notes.     Details: As reviewed and noted in HPI    Risk  OTC drugs.               ED Course as of 12/15/24 1153   Sun Dec 15, 2024   1146 COVID and flu were negative.  Snuffer and oxymetazoline prescribed.  Referred to primary care for asymptomatic hypertension [DS]      ED Course User Index  [DS] Theo Harrington MD                           Clinical Impression:  Final diagnoses:  [J00] Acute rhinitis (Primary)  [R04.0] Epistaxis          ED Disposition Condition    Discharge           ED Prescriptions       Medication Sig Dispense Start Date End Date Auth. Provider    oxymetazoline (AFRIN) 0.05 % nasal spray 1 spray by Nasal route 2 (two) times daily. for 3 days 15 mL 12/15/2024 12/18/2024 Theo Harrington MD          Follow-up Information       Follow up With Specialties Details Why Contact Info    Jose Pierre MD Internal Medicine In 1 week  [address]  [address]  [place] [address]  [phone]               Theo Harrington MD  12/15/24 3084

## 2024-12-15 NOTE — ED NOTES
Pt identifiers Ovidio Mortontiffany Guo Were checked and are correct  LOC: The patient is awake, alert, aware of environment with an appropriate affect. Oriented x4, speaking appropriately  APPEARANCE: Pt rates sinus pain a 1/10 , in no acute distress, pt is clean and well groomed, clothing properly fastened  SKIN: Skin warm, dry and intact, normal skin turgor, moist mucus membranes  RESPIRATORY: Airway is open and patent, respirations are spontaneous, even and unlabored, normal effort and rate  CARDIAC: Normal rate and rhythm, no peripheral edema noted, capillary refill < 3 seconds, bilateral radial pulses 2+  ABDOMEN: Soft, nontender, nondistended.   NEUROLOGIC: PERRL, facial expression is symmetrical, patient moving all extremities spontaneously, normal sensation in all extremities when touched with a finger.  Follows all commands appropriately  MUSCULOSKELETAL: No obvious deformities.

## 2024-12-15 NOTE — DISCHARGE INSTRUCTIONS
Return to the emergency department if any new or concerning symptoms occur    Take over the counter acetaminophen 1000mg or ibuprofen 600mg every 6 hours as needed for pain and inflammation    Follow up with your primary care in 5-7 days for repeat blood pressure check

## 2024-12-21 ENCOUNTER — HOSPITAL ENCOUNTER (EMERGENCY)
Facility: HOSPITAL | Age: 29
Discharge: HOME | End: 2024-12-22
Attending: STUDENT IN AN ORGANIZED HEALTH CARE EDUCATION/TRAINING PROGRAM
Payer: MEDICAID

## 2024-12-21 DIAGNOSIS — B96.89 BACTERIAL SINUSITIS: ICD-10-CM

## 2024-12-21 DIAGNOSIS — J32.9 BACTERIAL SINUSITIS: ICD-10-CM

## 2024-12-21 DIAGNOSIS — R42 DIZZINESS: ICD-10-CM

## 2024-12-21 DIAGNOSIS — R03.0 ELEVATED BLOOD PRESSURE READING: ICD-10-CM

## 2024-12-21 DIAGNOSIS — R09.81 SINUS CONGESTION: Primary | ICD-10-CM

## 2024-12-21 PROCEDURE — 93005 ELECTROCARDIOGRAM TRACING: CPT

## 2024-12-21 PROCEDURE — 93010 ELECTROCARDIOGRAM REPORT: CPT | Mod: ,,, | Performed by: INTERNAL MEDICINE

## 2024-12-21 PROCEDURE — 99284 EMERGENCY DEPT VISIT MOD MDM: CPT | Mod: 25

## 2024-12-21 PROCEDURE — 82962 GLUCOSE BLOOD TEST: CPT

## 2024-12-21 NOTE — Clinical Note
"Ovidio Parksoctt Montero was seen and treated in our emergency department on 12/21/2024.  He may return to work on 12/26/2024.       If you have any questions or concerns, please don't hesitate to call.      Jaziel Landon, DO"

## 2024-12-21 NOTE — Clinical Note
"Ovidio Parkscott Montero was seen and treated in our emergency department on 12/21/2024.  He may return to work on 12/23/2024.       If you have any questions or concerns, please don't hesitate to call.      Jaziel Landon, DO"

## 2024-12-21 NOTE — Clinical Note
"Ovidio Parkscott Montero was seen and treated in our emergency department on 12/21/2024.  He may return to work on 12/26/2024.       If you have any questions or concerns, please don't hesitate to call.      Jaziel Landon, DO" clear

## 2024-12-21 NOTE — Clinical Note
"Ovidio Parkscott Montero was seen and treated in our emergency department on 12/21/2024.  He may return to work on 12/26/2024.       If you have any questions or concerns, please don't hesitate to call.      Jaziel Landon, DO"

## 2024-12-22 VITALS
WEIGHT: 315 LBS | HEART RATE: 83 BPM | OXYGEN SATURATION: 97 % | BODY MASS INDEX: 47.74 KG/M2 | DIASTOLIC BLOOD PRESSURE: 88 MMHG | TEMPERATURE: 98 F | HEIGHT: 68 IN | SYSTOLIC BLOOD PRESSURE: 167 MMHG | RESPIRATION RATE: 20 BRPM

## 2024-12-22 LAB
OHS QRS DURATION: 110 MS
OHS QTC CALCULATION: 455 MS
POCT GLUCOSE: 106 MG/DL (ref 70–110)

## 2024-12-22 PROCEDURE — 25000003 PHARM REV CODE 250: Performed by: STUDENT IN AN ORGANIZED HEALTH CARE EDUCATION/TRAINING PROGRAM

## 2024-12-22 RX ORDER — ACETAMINOPHEN 325 MG/1
650 TABLET ORAL
Status: COMPLETED | OUTPATIENT
Start: 2024-12-22 | End: 2024-12-22

## 2024-12-22 RX ORDER — DOXYCYCLINE 100 MG/1
100 CAPSULE ORAL 2 TIMES DAILY
Qty: 14 CAPSULE | Refills: 0 | Status: SHIPPED | OUTPATIENT
Start: 2024-12-22 | End: 2024-12-29

## 2024-12-22 RX ORDER — PSEUDOEPHEDRINE HCL 30 MG
60 TABLET ORAL
Status: COMPLETED | OUTPATIENT
Start: 2024-12-22 | End: 2024-12-22

## 2024-12-22 RX ADMIN — PSEUDOEPHEDRINE HCL 60 MG: 30 TABLET, FILM COATED ORAL at 12:12

## 2024-12-22 RX ADMIN — ACETAMINOPHEN 650 MG: 325 TABLET ORAL at 12:12

## 2024-12-22 NOTE — DISCHARGE INSTRUCTIONS
You were evaluated in the emergency department today for sinus pressure and dizziness with ear pressure.  Although there were no findings of concern to necessitate admission to the hospital or warrant immediate surgical intervention, disease exists on a spectrum and your disease process may progress.  If this is the case, please watch your symptoms and return to the emergency department if you feel worse and are unable to discuss care with your primary care doctor in follow up in the next several days.  Specific information regarding your complaint has been provided.  Thank you for choosing [facility]!    Your ear drums are normal. Your symptoms are likely related to sinus fullness and viral infection.  You can use:    Sinus rinses (do not use sink.tap water, would just use bottled water).  Flonase daily  Pseudoephedrine, phenylephrine or Coricidin HBP (If your blood pressure is high) for sinus congestion  Daily antihistamine such as zyretc is also reasonable    Take antibiotics given that you have had greater than 1 week of symptoms, for presumable bacterial sinusitis    Acetaminophen or ibuprofen for pain in the sinuses    Follow up with your primary

## 2024-12-22 NOTE — ED PROVIDER NOTES
Source of History  patient and EMR    Chief Complaint    flu like symptoms (Cough, congestion, dizziness with bilateral ear pressure; can't hear out of right ear but left ear getting worse)      History of Present Illness    Ovidio Montero Jr. is a 29 y.o. male presenting with concern for sinus congestion, bilateral ear pressure, felt slightly vertiginous when leaning forward with significant facial pressure, R ear fullness.  Has had several days of flu-like illness for which he has seen previously and tested negative for flu and COVID.  He was seen on 12/15, but symptoms had started several days before then (thus present > 1 week).      Review of Systems    As per HPI and below:  Constitutional symptoms:  no fever   ENMT symptoms:  + nasal congestion, + ear fullness and congestion R>L, rhinorrhea and cough  Respiratory symptoms:  No shortness of breath  Cardiovascular symptoms:  No chest pain   Gastrointestinal symptoms:  no vomiting     Past History    As per HPI and below:  Past Medical History:   Diagnosis Date    Acquired acanthosis nigricans     ADHD (attention deficit hyperactivity disorder)     Dr. Champagne    Anxiety     Diabetes mellitus     HTN (hypertension)     Hyperlipidemia, mixed     Hypertension     Insulin resistance     Morbid obesity     Seasonal allergic rhinitis     Sleep disturbance     Dr. Champagne, counseling    Tic disorder     Vision abnormalities     Myopia & Astigmatism       Past Surgical History:   Procedure Laterality Date    CIRCUMCISION, PRIMARY         Social History     Socioeconomic History    Marital status: Single   Tobacco Use    Smoking status: Never     Passive exposure: Yes    Smokeless tobacco: Never   Substance and Sexual Activity    Alcohol use: No    Drug use: No    Sexual activity: Never   Social History Narrative    SOC. HX:  Parents . Lives with Mom in Apartment in [place]. Mom & Dad have family in the area. Mom evacuated for Hurricane [place] and  returned at the end of June, 09 from [place]. Some support from Mom's & Dad's families. Mom is a  in [facility]. Dad not too involved. + Smoker -- Mom, outside. + Pets -- 1 cat, 1 dog, inside.        EDU: Is Not  In School At This Time. Good grades: YES, C's-B's, possible F's; Good conduct: YES; Lots of friends: YES.        SAFETY:  Wears seatbelt 100% of time. GUNS -- None.       Family History   Problem Relation Name Age of Onset    Obesity Mother Ena     Anxiety disorder Mother Ena     Depression Father      Mental illness Father      Depression Paternal Aunt x1     Mental illness Paternal Aunt x1     Depression Maternal Grandmother      Bipolar disorder Maternal Grandmother      Leukemia Maternal Grandfather          Adult Leukemia - in Remission    Depression Paternal Grandfather      Suicidality Paternal Grandfather      Early death Paternal Grandfather      Cancer Other Mat DZ     Depression Other Mat DZ     Bipolar disorder Other Mat DZ     Mental illness Other Mat DZ     Stroke Other Mat DZ     Hypertension Other Mat DZ     Depression Other Pat DZ     Bipolar disorder Other Pat DZ     Mental illness Other Pat DZ        Review of patient's allergies indicates:   Allergen Reactions    Penicillins        No current facility-administered medications on file prior to encounter.     Current Outpatient Medications on File Prior to Encounter   Medication Sig Dispense Refill    amLODIPine (NORVASC) 5 MG tablet Take 1 tablet (5 mg total) by mouth once daily. 90 tablet 0    atorvastatin (LIPITOR) 20 MG tablet Take 1 tablet by mouth once daily.      buPROPion (WELLBUTRIN SR) 100 MG TBSR 12 hr tablet Take 100 mg by mouth every morning.      diclofenac sodium (VOLTAREN) 1 % Gel Apply 2 g topically 4 (four) times daily. 200 g 2    ibuprofen (ADVIL,MOTRIN) 800 MG tablet Take 1 tablet (800 mg total) by mouth every 6 (six) hours as needed for Pain. 20 tablet 0    lisdexamfetamine (VYVANSE) 50 MG capsule Take 50  "mg by mouth every morning.   (Patient not taking: Reported on 6/25/2024)      losartan (COZAAR) 100 MG tablet Take 1.5 tablets (150 mg total) by mouth once daily. 135 tablet 0    melatonin (MELATIN) 5 mg Take by mouth.      metformin (GLUCOPHAGE) 500 MG tablet TAKE 1 TABLET BY MOUTH TWICE A DAY 60 tablet 5    ondansetron (ZOFRAN-ODT) 4 MG TbDL Take 4 mg by mouth every 6 (six) hours as needed.      semaglutide (OZEMPIC) 1 mg/dose (4 mg/3 mL) Inject 1 mg into the skin.         Physical Exam    Reviewed nursing notes.  Vitals:    12/21/24 2349 12/22/24 0115   BP: (!) 193/115 (!) 167/88   BP Location: Right arm Left arm   Patient Position:  Lying   Pulse: 80 79   Resp: 20 20   Temp: 97.7 °F (36.5 °C) 98.2 °F (36.8 °C)   TempSrc: Oral Oral   SpO2: 98% 96%   Weight: (!) 142.9 kg (315 lb 0.6 oz)    Height: 5' 8" (1.727 m)      General:  Alert, no acute distress.    Skin:  Warm, dry, intact.  No rash.  Head:  Normocephalic, atraumatic.    Neck:  Supple.   Eye:  extraocular movements are intact.    Ears, nose, mouth and throat:  Normal phonation.  Bilateral nasal congestion.  TMs mildly erythematous, but no bulging.  Positive cone of light.  No occlusive cerumen.  Oropharynx normal  Uvula midline without edema  Cardiovascular:  No edema.  Regular pulses.    Respiratory:  Respirations are non-labored.  Clear bilaterally.  Dry cough is present.  Gastrointestinal:  Nondistended.  No belching or vomiting.  Back: Normal gait.  Ambulatory.  Musculoskeletal:  Normal range of motion observed.  Neurological:  Alert and oriented to person, place, time, and situation.  No focal deficits observed.   Psychiatric:  Cooperative, appropriate mood & affect.       Initial MDM and Data Review    29 y.o. male presenting for evaluation of concern for fullness of ear and sinus congestion. Vitals reveal HTN but pt notes this is chronic and is without related symptoms at this time.  Clearly with sinusitis on exam but no other respiratory " illness    Differential includes but is not limited to: sinusitis, bacterial is likely at this juncture (>1 week sx), less likely otitis externa/media given lack of physical exam findings  Doubt pna, clear lungs and sx seem to be mostly upper airway    Work-up includes: offered repeat viral studies but declined    Interventions include: analgesia, pseudoephedrine     The patient has significant medical comorbidities that influence decision making for this acute process, such as: HTN ADHD DM    I decided to obtain the patient's medical records and review relevant documentation from hospital records.  Pertinent information is noted.      Medications   acetaminophen tablet 650 mg (650 mg Oral Given 12/22/24 0050)   pseudoephedrine tablet 60 mg (60 mg Oral Given 12/22/24 0050)       Results and ED Course    Labs Reviewed   POCT GLUCOSE MONITORING CONTINUOUS       Imaging Results    None         ED Course as of 12/22/24 0150   Sun Dec 22, 2024   0058 Gluc 106 [AC]      ED Course User Index  [AC] Jaziel Landon, DO               EKG interpreted by myself    EKG  Performed: 12/22/2024   Rate/Rhythm/Axis:  82 bpm, sinus rhythm, normal axis   ms  Qtc 455 ms  Impression:  Normal Sinus Rhythm.  EKG without evidence of Na channel blockade, K channel blockade, there is no prolonged QTc or digoxin effect.  There is no STEMI or signs of ischemia.  Isolated T-wave flattening      Relevant imaging interpreted by myself      Impression and Plan    29 y.o. male with findings of sinusitis based on the work up in the emergency department as above.    Important lab/imaging findings include:  Reviewed    Given greater than 1 week of symptoms, will do antibiotics for presumed bacterial sinusitis  Symptomatic and supportive treatment was also discussed  Elevated blood pressure reading, patient remains asymptomatic, decreased on rechecked    All tests, treatment options and disposition options were discussed with the patient.  The  decision was made to discharge the patient to home.    The patient was discharged in stable condition and all further questions/concerns by patient and/or family were addressed.    The patient will follow up with their primary care physician as discussed in the next several days or return if any further concerns or change in symptoms necessitating re-evaluation.           Final diagnoses:  [R42] Dizziness  [R09.81] Sinus congestion (Primary)  [J32.9, B96.89] Bacterial sinusitis  [R03.0] Elevated blood pressure reading        ED Disposition Condition    Discharge Stable          ED Prescriptions       Medication Sig Dispense Start Date End Date Auth. Provider    doxycycline (VIBRAMYCIN) 100 MG Cap Take 1 capsule (100 mg total) by mouth 2 (two) times daily. for 7 days 14 capsule 12/22/2024 12/29/2024 Jaziel Landon,           Follow-up Information       Follow up With Specialties Details Why Contact Info    Jose Pierre MD Internal Medicine Schedule an appointment as soon as possible for a visit in 1 week  [address] [facility] [address]  [facility] [address]  [phone]                 Jaziel Landon DO  12/22/24 0155

## 2024-12-22 NOTE — ED TRIAGE NOTES
Ovidio Gomez Winston Guo, a 29 y.o. male presents to the ED w/ complaint of dizziness,cough ,bilateral ear pressure.Pt denies any SOB,chest pain,chill,fever at this time.    Triage note:  Chief Complaint   Patient presents with    flu like symptoms     Cough, congestion, dizziness with bilateral ear pressure; can't hear out of right ear but left ear getting worse     Review of patient's allergies indicates:   Allergen Reactions    Penicillins      Past Medical History:   Diagnosis Date    Acquired acanthosis nigricans     ADHD (attention deficit hyperactivity disorder)     Dr. Champagne    Anxiety     Diabetes mellitus     HTN (hypertension)     Hyperlipidemia, mixed     Hypertension     Insulin resistance     Morbid obesity     Seasonal allergic rhinitis     Sleep disturbance     Dr. Champagne, counseling    Tic disorder     Vision abnormalities     Myopia & Astigmatism

## 2025-02-24 ENCOUNTER — HOSPITAL ENCOUNTER (EMERGENCY)
Facility: HOSPITAL | Age: 30
Discharge: HOME | End: 2025-02-25
Attending: EMERGENCY MEDICINE
Payer: COMMERCIAL

## 2025-02-24 DIAGNOSIS — M10.9 GOUT, UNSPECIFIED CAUSE, UNSPECIFIED CHRONICITY, UNSPECIFIED SITE: ICD-10-CM

## 2025-02-24 DIAGNOSIS — I10 HYPERTENSION: Primary | ICD-10-CM

## 2025-02-24 LAB
HCV AB SERPL QL IA: NORMAL
HIV 1+2 AB+HIV1 P24 AG SERPL QL IA: NORMAL
TROPONIN I SERPL DL<=0.01 NG/ML-MCNC: <3 NG/L (ref 0–35)

## 2025-02-24 PROCEDURE — 99284 EMERGENCY DEPT VISIT MOD MDM: CPT | Mod: 25

## 2025-02-24 PROCEDURE — 93010 ELECTROCARDIOGRAM REPORT: CPT | Mod: ,,, | Performed by: INTERNAL MEDICINE

## 2025-02-24 PROCEDURE — 87389 HIV-1 AG W/HIV-1&-2 AB AG IA: CPT | Performed by: PHYSICIAN ASSISTANT

## 2025-02-24 PROCEDURE — 84484 ASSAY OF TROPONIN QUANT: CPT | Performed by: EMERGENCY MEDICINE

## 2025-02-24 PROCEDURE — 86803 HEPATITIS C AB TEST: CPT | Performed by: PHYSICIAN ASSISTANT

## 2025-02-24 PROCEDURE — 25000003 PHARM REV CODE 250: Performed by: EMERGENCY MEDICINE

## 2025-02-24 PROCEDURE — 93005 ELECTROCARDIOGRAM TRACING: CPT

## 2025-02-24 RX ORDER — IBUPROFEN 400 MG/1
400 TABLET ORAL
Status: COMPLETED | OUTPATIENT
Start: 2025-02-24 | End: 2025-02-24

## 2025-02-24 RX ORDER — COLCHICINE 0.6 MG/1
1.2 TABLET, FILM COATED ORAL
Status: COMPLETED | OUTPATIENT
Start: 2025-02-24 | End: 2025-02-24

## 2025-02-24 RX ADMIN — COLCHICINE 1.2 MG: 0.6 TABLET, FILM COATED ORAL at 10:02

## 2025-02-24 RX ADMIN — IBUPROFEN 400 MG: 400 TABLET ORAL at 10:02

## 2025-02-25 VITALS
DIASTOLIC BLOOD PRESSURE: 105 MMHG | RESPIRATION RATE: 16 BRPM | OXYGEN SATURATION: 99 % | WEIGHT: 315 LBS | SYSTOLIC BLOOD PRESSURE: 169 MMHG | HEIGHT: 71 IN | BODY MASS INDEX: 44.1 KG/M2 | TEMPERATURE: 99 F | HEART RATE: 82 BPM

## 2025-02-25 LAB
BUN SERPL-MCNC: 16 MG/DL (ref 6–30)
BUN SERPL-MCNC: 18 MG/DL (ref 6–30)
CHLORIDE SERPL-SCNC: 103 MMOL/L (ref 95–110)
CHLORIDE SERPL-SCNC: 108 MMOL/L (ref 95–110)
CREAT SERPL-MCNC: 0.8 MG/DL (ref 0.5–1.4)
CREAT SERPL-MCNC: 0.8 MG/DL (ref 0.5–1.4)
GLUCOSE SERPL-MCNC: 100 MG/DL (ref 70–110)
GLUCOSE SERPL-MCNC: 94 MG/DL (ref 70–110)
HCT VFR BLD CALC: 46 %PCV (ref 36–54)
HCT VFR BLD CALC: 50 %PCV (ref 36–54)
OHS QRS DURATION: 100 MS
OHS QTC CALCULATION: 461 MS
POC IONIZED CALCIUM: 0.92 MMOL/L (ref 1.06–1.42)
POC IONIZED CALCIUM: 1.19 MMOL/L (ref 1.06–1.42)
POC TCO2 (MEASURED): 28 MMOL/L (ref 23–29)
POC TCO2 (MEASURED): 31 MMOL/L (ref 23–29)
POTASSIUM BLD-SCNC: 4.7 MMOL/L (ref 3.5–5.1)
POTASSIUM BLD-SCNC: 7.4 MMOL/L (ref 3.5–5.1)
SAMPLE: ABNORMAL
SAMPLE: ABNORMAL
SODIUM BLD-SCNC: 135 MMOL/L (ref 136–145)
SODIUM BLD-SCNC: 141 MMOL/L (ref 136–145)

## 2025-02-25 RX ORDER — COLCHICINE 0.6 MG/1
0.6 TABLET ORAL DAILY
Qty: 10 TABLET | Refills: 0 | Status: SHIPPED | OUTPATIENT
Start: 2025-02-25 | End: 2026-02-25

## 2025-02-25 RX ORDER — BUPROPION HYDROCHLORIDE 100 MG/1
100 TABLET, EXTENDED RELEASE ORAL EVERY MORNING
Qty: 30 TABLET | Refills: 0 | Status: SHIPPED | OUTPATIENT
Start: 2025-02-25

## 2025-02-25 RX ORDER — ATORVASTATIN CALCIUM 20 MG/1
20 TABLET, FILM COATED ORAL DAILY
Qty: 90 TABLET | Refills: 6 | Status: SHIPPED | OUTPATIENT
Start: 2025-02-25 | End: 2026-09-09

## 2025-02-25 RX ORDER — LOSARTAN POTASSIUM 100 MG/1
150 TABLET ORAL DAILY
Qty: 135 TABLET | Refills: 0 | Status: SHIPPED | OUTPATIENT
Start: 2025-02-25 | End: 2025-05-28

## 2025-02-25 RX ORDER — METFORMIN HYDROCHLORIDE 500 MG/1
500 TABLET ORAL 2 TIMES DAILY
Qty: 60 TABLET | Refills: 5 | Status: SHIPPED | OUTPATIENT
Start: 2025-02-25

## 2025-02-25 NOTE — ED NOTES
I-STAT Chem-8+ Results:   Value Reference Range   Sodium 135 136-145 mmol/L   Potassium  7.4 3.5-5.1 mmol/L   Chloride 108  mmol/L   Ionized Calcium 0.92 1.06-1.42 mmol/L   CO2 (measured) 28 23-29 mmol/L   Glucose 100  mg/dL   BUN 18 6-30 mg/dL   Creatinine 0.8 0.5-1.4 mg/dL   Hematocrit 50 36-54%

## 2025-02-25 NOTE — ED PROVIDER NOTES
Encounter Date: 2/24/2025       History     Chief Complaint   Patient presents with    Hypertension     Pt complaining of headache, chest pain x30 mins ago. Pt states feels like his blood pressure is high. 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  Patient has had a lot of difficulty getting prescriptions and so has only been able to take his amlodipine recently in his not been on his metformin, losartan or Wellbutrin.  Tonight while at work he was feeling bad and so checked his blood pressure and it was in the 180s systolic which concerned him.  He then reports he had an episode of chest discomfort and a mild headache.  He decided to check in to get evaluated.  Patient did take his amlodipine today.    Patient also complaining of pain to his left big toe that started earlier today, pain has been worsening throughout the day.  He has had no history of trauma.  No history of gout.    The history is provided by the patient.     Review of patient's allergies indicates:   Allergen Reactions    Penicillins      Past Medical History:   Diagnosis Date    Acquired acanthosis nigricans     ADHD (attention deficit hyperactivity disorder)     Dr. Champagne    Anxiety     Diabetes mellitus     HTN (hypertension)     Hyperlipidemia, mixed     Hypertension     Insulin resistance     Morbid obesity     Seasonal allergic rhinitis     Sleep disturbance     Dr. Champagne, counseling    Tic disorder     Vision abnormalities     Myopia & Astigmatism     Past Surgical History:   Procedure Laterality Date    CIRCUMCISION, PRIMARY       Family History   Problem Relation Name Age of Onset    Obesity Mother Ena     Anxiety disorder Mother Ena     Depression Father      Mental illness Father      Depression Paternal Aunt x1     Mental illness Paternal Aunt  x1     Depression Maternal Grandmother      Bipolar disorder Maternal Grandmother      Leukemia Maternal Grandfather          Adult Leukemia - in Remission    Depression Paternal Grandfather      Suicidality Paternal Grandfather      Early death Paternal Grandfather      Cancer Other Mat DZ     Depression Other Mat DZ     Bipolar disorder Other Mat DZ     Mental illness Other Mat DZ     Stroke Other Mat DZ     Hypertension Other Mat DZ     Depression Other Pat DZ     Bipolar disorder Other Pat DZ     Mental illness Other Pat DZ      Social History[1]  Review of Systems    Physical Exam     Initial Vitals [02/24/25 2109]   BP Pulse Resp Temp SpO2   (!) 226/125 99 18 98.7 °F (37.1 °C) 97 %      MAP       --         Physical Exam    Nursing note and vitals reviewed.  Constitutional: Vital signs are normal. He appears well-developed and well-nourished. He is not diaphoretic.  Non-toxic appearance. He does not appear ill. No distress.   HENT:   Head: Normocephalic and atraumatic. Mouth/Throat: Mucous membranes are normal. Mucous membranes are not dry.   Eyes: Conjunctivae and lids are normal.   Neck: Neck supple.   Normal range of motion.  Cardiovascular:  Normal rate.           Pulmonary/Chest: No respiratory distress.   Musculoskeletal:         General: Tenderness present. No edema.      Cervical back: Normal range of motion and neck supple.      Comments: Left foot:  No area of erythema, or warmth or deformity.  Patient with significant tenderness over the MTP joint.     Neurological: He is alert and oriented to person, place, and time. He has normal strength.   Skin: Skin is dry and intact. No pallor.   Psychiatric: He has a normal mood and affect. His speech is normal and behavior is normal.         ED Course   Procedures  Labs Reviewed   ISTAT PROCEDURE - Abnormal       Result Value    POC Glucose 94      POC BUN 16      POC Creatinine 0.8      POC Sodium 141      POC Potassium 4.7      POC Chloride 103      POC  TCO2 (MEASURED) 31 (*)     POC Ionized Calcium 1.19      POC Hematocrit 46      Sample CLARE     HEPATITIS C ANTIBODY    Hepatitis C Ab Non-reactive      Narrative:     Release to patient->Immediate   HIV 1 / 2 ANTIBODY    HIV 1/2 Ag/Ab Non-reactive      Narrative:     Release to patient->Immediate   TROPONIN I HIGH SENSITIVITY    Troponin I High Sensitivity <3     ISTAT CHEM8   ISTAT CHEM8     EKG Readings: (Independently Interpreted)   Initial Reading: No STEMI. Rhythm: Normal Sinus Rhythm. Ectopy: No Ectopy. Conduction: Normal. ST Segments: Normal ST Segments. T Waves: Normal.       Imaging Results    None          Medications   ibuprofen tablet 400 mg (400 mg Oral Given 2/24/25 2254)   colchicine capsule/tablet 1.2 mg (1.2 mg Oral Given 2/24/25 2254)     Medical Decision Making  Patient is here with multiple complaints.  He has some nonspecific chest pain, headache but his biggest concern seems to be his elevated blood pressure and lack of prescription medication, needs refills and does not have follow up arranged.  He was previously receiving care in the [facility] system but now has insurance and can be seen in the [facility] system.    In addition, patient with pain to his left MTP joint that is atraumatic.  I strongly suspect gout.  Will plan to give a dose of colchicine here and NSAIDs for pain.  Will hold off on steroids given new onset diabetes.    Basic labs checked and unremarkable including a troponin x1.  Will refill all of patient's medications tonight and provide him with a referral to the primary care clinic.        Amount and/or Complexity of Data Reviewed  External Data Reviewed: notes.  Labs: ordered. Decision-making details documented in ED Course.  ECG/medicine tests: ordered and independent interpretation performed. Decision-making details documented in ED Course.    Risk  Prescription drug management.               ED Course as of 02/25/25 0107   Mon Feb 24, 2025 2125 Triage EKG reviewed: No STEMI  [LP]      ED Course User Index  [LP] Jeremy Gibbs III, MD                           Clinical Impression:  Final diagnoses:  [I10] Hypertension (Primary)  [M10.9] Gout, unspecified cause, unspecified chronicity, unspecified site          ED Disposition Condition    Discharge Stable          ED Prescriptions       Medication Sig Dispense Start Date End Date Auth. Provider    colchicine (COLCRYS) 0.6 mg tablet Take 1 tablet (0.6 mg total) by mouth once daily. 10 tablet 2/25/2025 2/25/2026 Isela Lui MD    buPROPion (WELLBUTRIN SR) 100 MG TBSR 12 hr tablet Take 1 tablet (100 mg total) by mouth every morning. 30 tablet 2/25/2025 -- Isela Lui MD    atorvastatin (LIPITOR) 20 MG tablet Take 1 tablet (20 mg total) by mouth once daily. 90 tablet 2/25/2025 9/9/2026 Isela Lui MD    losartan (COZAAR) 100 MG tablet Take 1.5 tablets (150 mg total) by mouth once daily. 135 tablet 2/25/2025 5/26/2025 Isela Lui MD    metFORMIN (GLUCOPHAGE) 500 MG tablet Take 1 tablet (500 mg total) by mouth 2 (two) times daily. 60 tablet 2/25/2025 -- Isela Lui MD          Follow-up Information       Follow up With Specialties Details Why Contact Info    [facility] - Emergency Dept Emergency Medicine  If symptoms worsen [facility] [facility] [address] [phone]                 [1]   Social History  Tobacco Use    Smoking status: Never     Passive exposure: Yes    Smokeless tobacco: Never   Substance Use Topics    Alcohol use: No    Drug use: No        Isela Lui MD  02/25/25 0107

## 2025-02-25 NOTE — ED NOTES
..I-STAT Chem-8+ Results:   Value Reference Range   Sodium 141 136-145 mmol/L   Potassium  4.7 3.5-5.1 mmol/L   Chloride 103  mmol/L   Ionized Calcium 1.19 1.06-1.42 mmol/L   CO2 (measured) 31 23-29 mmol/L   Glucose 94  mg/dL   BUN 16 6-30 mg/dL   Creatinine 0.8 0.5-1.4 mg/dL   Hematocrit 46 36-54%

## 2025-02-25 NOTE — ED TRIAGE NOTES
Pt presents to ED for c/o Hypertension. States usually has high blood pressure but not this high normally. On amlodipine for BP. Endorsing Chest pain. Midsternal 4/10 aching chest pain. Denies SOB. Endorsing headache. Also endorses left great toe pain. Hurts with pressure. Denies injury.

## 2025-03-26 RX ORDER — BUPROPION HYDROCHLORIDE 100 MG/1
100 TABLET, EXTENDED RELEASE ORAL EVERY MORNING
Qty: 30 TABLET | Refills: 0 | OUTPATIENT
Start: 2025-03-26

## 2025-04-13 PROCEDURE — 96365 THER/PROPH/DIAG IV INF INIT: CPT

## 2025-04-13 PROCEDURE — 96361 HYDRATE IV INFUSION ADD-ON: CPT

## 2025-04-13 PROCEDURE — 96375 TX/PRO/DX INJ NEW DRUG ADDON: CPT

## 2025-04-13 PROCEDURE — 96376 TX/PRO/DX INJ SAME DRUG ADON: CPT

## 2025-04-13 PROCEDURE — 99285 EMERGENCY DEPT VISIT HI MDM: CPT | Mod: 25

## 2025-04-14 ENCOUNTER — HOSPITAL ENCOUNTER (EMERGENCY)
Facility: HOSPITAL | Age: 30
Discharge: HOME | End: 2025-04-14
Attending: EMERGENCY MEDICINE
Payer: COMMERCIAL

## 2025-04-14 VITALS
TEMPERATURE: 98 F | HEART RATE: 67 BPM | SYSTOLIC BLOOD PRESSURE: 130 MMHG | RESPIRATION RATE: 18 BRPM | OXYGEN SATURATION: 99 % | DIASTOLIC BLOOD PRESSURE: 88 MMHG

## 2025-04-14 DIAGNOSIS — K80.20 GALL STONES: Primary | ICD-10-CM

## 2025-04-14 DIAGNOSIS — N17.9 AKI (ACUTE KIDNEY INJURY): ICD-10-CM

## 2025-04-14 DIAGNOSIS — R11.2 NAUSEA AND VOMITING: ICD-10-CM

## 2025-04-14 LAB
ABSOLUTE EOSINOPHIL (OHS): 0.17 K/UL
ABSOLUTE MONOCYTE (OHS): 0.75 K/UL (ref 0.3–1)
ABSOLUTE NEUTROPHIL COUNT (OHS): 5.74 K/UL (ref 1.8–7.7)
ALBUMIN SERPL BCP-MCNC: 3.7 G/DL (ref 3.5–5.2)
ALP SERPL-CCNC: 67 UNIT/L (ref 40–150)
ALT SERPL W/O P-5'-P-CCNC: 28 UNIT/L (ref 10–44)
ANION GAP (OHS): 10 MMOL/L (ref 8–16)
AST SERPL-CCNC: 23 UNIT/L (ref 11–45)
BASOPHILS # BLD AUTO: 0.04 K/UL
BASOPHILS NFR BLD AUTO: 0.4 %
BILIRUB SERPL-MCNC: 0.3 MG/DL (ref 0.1–1)
BILIRUB UR QL STRIP.AUTO: NEGATIVE
BUN SERPL-MCNC: 21 MG/DL (ref 6–20)
BUN SERPL-MCNC: 21 MG/DL (ref 6–30)
CALCIUM SERPL-MCNC: 9.6 MG/DL (ref 8.7–10.5)
CHLORIDE SERPL-SCNC: 104 MMOL/L (ref 95–110)
CHLORIDE SERPL-SCNC: 107 MMOL/L (ref 95–110)
CLARITY UR: CLEAR
CO2 SERPL-SCNC: 25 MMOL/L (ref 23–29)
COLOR UR AUTO: YELLOW
CREAT SERPL-MCNC: 1.6 MG/DL (ref 0.5–1.4)
CREAT SERPL-MCNC: 1.9 MG/DL (ref 0.5–1.4)
ERYTHROCYTE [DISTWIDTH] IN BLOOD BY AUTOMATED COUNT: 14.2 % (ref 11.5–14.5)
GFR SERPLBLD CREATININE-BSD FMLA CKD-EPI: 60 ML/MIN/1.73/M2
GLUCOSE SERPL-MCNC: 92 MG/DL (ref 70–110)
GLUCOSE SERPL-MCNC: 94 MG/DL (ref 70–110)
GLUCOSE UR QL STRIP: NEGATIVE
HCT VFR BLD AUTO: 40.8 % (ref 40–54)
HCT VFR BLD CALC: 39 %PCV (ref 36–54)
HGB BLD-MCNC: 13.2 GM/DL (ref 14–18)
HGB UR QL STRIP: NEGATIVE
IMM GRANULOCYTES # BLD AUTO: 0.03 K/UL (ref 0–0.04)
IMM GRANULOCYTES NFR BLD AUTO: 0.3 % (ref 0–0.5)
KETONES UR QL STRIP: NEGATIVE
LEUKOCYTE ESTERASE UR QL STRIP: NEGATIVE
LIPASE SERPL-CCNC: 86 U/L (ref 4–60)
LYMPHOCYTES # BLD AUTO: 2.51 K/UL (ref 1–4.8)
MCH RBC QN AUTO: 27.3 PG (ref 27–31)
MCHC RBC AUTO-ENTMCNC: 32.4 G/DL (ref 32–36)
MCV RBC AUTO: 84 FL (ref 82–98)
NITRITE UR QL STRIP: NEGATIVE
NUCLEATED RBC (/100WBC) (OHS): 0 /100 WBC
OHS QRS DURATION: 112 MS
OHS QTC CALCULATION: 417 MS
PH UR STRIP: 5 [PH]
PLATELET # BLD AUTO: 295 K/UL (ref 150–450)
PMV BLD AUTO: 10.3 FL (ref 9.2–12.9)
POC IONIZED CALCIUM: 1.18 MMOL/L (ref 1.06–1.42)
POC TCO2 (MEASURED): 32 MMOL/L (ref 23–29)
POTASSIUM BLD-SCNC: 3.9 MMOL/L (ref 3.5–5.1)
POTASSIUM SERPL-SCNC: 3.9 MMOL/L (ref 3.5–5.1)
PROT SERPL-MCNC: 7.5 GM/DL (ref 6–8.4)
PROT UR QL STRIP: ABNORMAL
RBC # BLD AUTO: 4.84 M/UL (ref 4.6–6.2)
RELATIVE EOSINOPHIL (OHS): 1.8 %
RELATIVE LYMPHOCYTE (OHS): 27.2 % (ref 18–48)
RELATIVE MONOCYTE (OHS): 8.1 % (ref 4–15)
RELATIVE NEUTROPHIL (OHS): 62.2 % (ref 38–73)
SAMPLE: ABNORMAL
SODIUM BLD-SCNC: 141 MMOL/L (ref 136–145)
SODIUM SERPL-SCNC: 142 MMOL/L (ref 136–145)
SP GR UR STRIP: 1.01
UROBILINOGEN UR STRIP-ACNC: NEGATIVE EU/DL
WBC # BLD AUTO: 9.24 K/UL (ref 3.9–12.7)

## 2025-04-14 PROCEDURE — 25000003 PHARM REV CODE 250: Performed by: EMERGENCY MEDICINE

## 2025-04-14 PROCEDURE — 93010 ELECTROCARDIOGRAM REPORT: CPT | Mod: ,,, | Performed by: INTERNAL MEDICINE

## 2025-04-14 PROCEDURE — 93005 ELECTROCARDIOGRAM TRACING: CPT

## 2025-04-14 PROCEDURE — 82040 ASSAY OF SERUM ALBUMIN: CPT | Performed by: EMERGENCY MEDICINE

## 2025-04-14 PROCEDURE — 83690 ASSAY OF LIPASE: CPT | Performed by: EMERGENCY MEDICINE

## 2025-04-14 PROCEDURE — 63600175 PHARM REV CODE 636 W HCPCS: Performed by: EMERGENCY MEDICINE

## 2025-04-14 PROCEDURE — 85025 COMPLETE CBC W/AUTO DIFF WBC: CPT | Performed by: EMERGENCY MEDICINE

## 2025-04-14 PROCEDURE — 25000003 PHARM REV CODE 250: Performed by: STUDENT IN AN ORGANIZED HEALTH CARE EDUCATION/TRAINING PROGRAM

## 2025-04-14 PROCEDURE — 63600175 PHARM REV CODE 636 W HCPCS: Performed by: STUDENT IN AN ORGANIZED HEALTH CARE EDUCATION/TRAINING PROGRAM

## 2025-04-14 PROCEDURE — 81003 URINALYSIS AUTO W/O SCOPE: CPT | Performed by: EMERGENCY MEDICINE

## 2025-04-14 RX ORDER — ACETAMINOPHEN 500 MG
1000 TABLET ORAL
Status: COMPLETED | OUTPATIENT
Start: 2025-04-14 | End: 2025-04-14

## 2025-04-14 RX ORDER — ALUMINUM HYDROXIDE, MAGNESIUM HYDROXIDE, AND SIMETHICONE 1200; 120; 1200 MG/30ML; MG/30ML; MG/30ML
30 SUSPENSION ORAL
Status: COMPLETED | OUTPATIENT
Start: 2025-04-14 | End: 2025-04-14

## 2025-04-14 RX ORDER — SUCRALFATE 1 G/10ML
1 SUSPENSION ORAL
Status: COMPLETED | OUTPATIENT
Start: 2025-04-14 | End: 2025-04-14

## 2025-04-14 RX ORDER — ONDANSETRON HYDROCHLORIDE 2 MG/ML
4 INJECTION, SOLUTION INTRAVENOUS
Status: COMPLETED | OUTPATIENT
Start: 2025-04-14 | End: 2025-04-14

## 2025-04-14 RX ORDER — MORPHINE SULFATE 4 MG/ML
4 INJECTION, SOLUTION INTRAMUSCULAR; INTRAVENOUS
Refills: 0 | Status: DISCONTINUED | OUTPATIENT
Start: 2025-04-14 | End: 2025-04-14 | Stop reason: HOSPADM

## 2025-04-14 RX ORDER — METOCLOPRAMIDE HYDROCHLORIDE 5 MG/ML
10 INJECTION INTRAMUSCULAR; INTRAVENOUS
Status: COMPLETED | OUTPATIENT
Start: 2025-04-14 | End: 2025-04-14

## 2025-04-14 RX ORDER — ONDANSETRON 4 MG/1
4 TABLET, ORALLY DISINTEGRATING ORAL EVERY 6 HOURS PRN
Qty: 12 TABLET | Refills: 0 | Status: SHIPPED | OUTPATIENT
Start: 2025-04-14

## 2025-04-14 RX ADMIN — SUCRALFATE 1 G: 1 SUSPENSION ORAL at 02:04

## 2025-04-14 RX ADMIN — SODIUM CHLORIDE 500 ML: 9 INJECTION, SOLUTION INTRAVENOUS at 09:04

## 2025-04-14 RX ADMIN — ONDANSETRON 4 MG: 2 INJECTION INTRAMUSCULAR; INTRAVENOUS at 02:04

## 2025-04-14 RX ADMIN — PROMETHAZINE HYDROCHLORIDE 6.25 MG: 25 INJECTION INTRAMUSCULAR; INTRAVENOUS at 05:04

## 2025-04-14 RX ADMIN — SODIUM CHLORIDE 500 ML: 9 INJECTION, SOLUTION INTRAVENOUS at 05:04

## 2025-04-14 RX ADMIN — ONDANSETRON 4 MG: 2 INJECTION INTRAMUSCULAR; INTRAVENOUS at 01:04

## 2025-04-14 RX ADMIN — METOCLOPRAMIDE 10 MG: 5 INJECTION, SOLUTION INTRAMUSCULAR; INTRAVENOUS at 09:04

## 2025-04-14 RX ADMIN — SODIUM CHLORIDE, POTASSIUM CHLORIDE, SODIUM LACTATE AND CALCIUM CHLORIDE 1000 ML: 600; 310; 30; 20 INJECTION, SOLUTION INTRAVENOUS at 01:04

## 2025-04-14 RX ADMIN — ALUMINUM HYDROXIDE, MAGNESIUM HYDROXIDE, AND SIMETHICONE 30 ML: 200; 200; 20 SUSPENSION ORAL at 02:04

## 2025-04-14 RX ADMIN — ACETAMINOPHEN 1000 MG: 500 TABLET ORAL at 02:04

## 2025-04-14 NOTE — Clinical Note
"Ovidio Parkscott Montero was seen and treated in our emergency department on 4/13/2025.  He may return to work on 04/16/2025.       If you have any questions or concerns, please don't hesitate to call.      Sharon Mota/ MD Myles    "

## 2025-04-14 NOTE — ED TRIAGE NOTES
Ovidio Gomez Winston Guo, a 29 y.o. male presents to the ED w/ complaint of     Triage note:  Chief Complaint   Patient presents with    Abdominal Pain     X2days nausea and vomiting w/abdominal pain. Denies any diarrhea.    Epigastric abd pain, mid back pain, headache, and decreased PO intake. Denies constipation. Pt also reports pain/ pressure on urination and flank pain bilat 3 days ago that has since resolved. Denies current dysuria, hematuria.   Review of patient's allergies indicates:   Allergen Reactions    Penicillins      Past Medical History:   Diagnosis Date    Acquired acanthosis nigricans     ADHD (attention deficit hyperactivity disorder)     Dr. Champagne    Anxiety     Diabetes mellitus     HTN (hypertension)     Hyperlipidemia, mixed     Hypertension     Insulin resistance     Morbid obesity     Seasonal allergic rhinitis     Sleep disturbance     Dr. Champagne, counseling    Tic disorder     Vision abnormalities     Myopia & Astigmatism

## 2025-04-14 NOTE — Clinical Note
"Ovidio Parkon" Winston was seen and treated in our emergency department on 4/13/2025.  He may return to work on 04/16/2025.       If you have any questions or concerns, please don't hesitate to call.      Sharon Mota/ NATALIE Bueno    "

## 2025-04-14 NOTE — ED NOTES
Pt informed that DR will be by to speak with him - and that the Dr is w/ acritical pt in another room at this time.

## 2025-04-14 NOTE — PROGRESS NOTES
I received sign-out on this patient.  Briefly, patient is a 29-year-old male here with nausea, vomiting, epigastric and left upper quadrant abdominal discomfort.  Mildly tender in the left upper quadrant without rebound or guarding.  Abdominal exam reassuring.  Vitals also reassuring here.  Most of the patient's labs still pending.  Plan to treat symptomatically with Zofran, Maalox and IV fluids.  If improved and lab work reassuring, plan for discharge home with Carafate and Zofran ODT.    Patient still having significant epigastric discomfort and nausea. He is also reporting vague bilateral flank pain. Repeat ISTAT with worsening Cr (1.6 -> 1.9). Will order more fluids, antiemetics, analgesia. Will also order CT imaging without contrast given MILEY. He is having vague flank pain so c/f ureteral stone vs other infectious etiology. Plan to f/u imaging and re-evaluate.  Care transitioned to oncoming staff.

## 2025-04-14 NOTE — ED PROVIDER NOTES
Emergency Department Provider Note    Ovidio Montero Jr.   29 y.o. 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 He has the below past medical history. He was on-shift when he decided that he needed to be evaluated for nausea, vomiting, and abdominal pain that began on 4/12. He spent that day vomiting. He tried to take a dose of Zofran remaining from a previous illness but thinks he regurgitated the mediation. He had nausea throughout yesterday but no vomiting. He took a dose of Pepto-Bismol yesterday without relief. He denies sick contact. He has discomfort across his upper abdomen that radiates to his back. He denies diarrhea and constipation. He had dysuria a few days ago that resolved. He denies fever and chills. He feels dehydrated.         Past Medical History:   Diagnosis Date    Acquired acanthosis nigricans     ADHD (attention deficit hyperactivity disorder)     Dr. Champagne    Anxiety     Diabetes mellitus     HTN (hypertension)     Hyperlipidemia, mixed     Hypertension     Insulin resistance     Morbid obesity     Seasonal allergic rhinitis     Sleep disturbance     Dr. Champagne, counseling    Tic disorder     Vision abnormalities     Myopia & Astigmatism      Past Surgical History:   Procedure Laterality Date    CIRCUMCISION, PRIMARY        Family History   Problem Relation Name Age of Onset    Obesity Mother Ena     Anxiety disorder Mother Ena     Depression Father      Mental illness Father      Depression Paternal Aunt x1     Mental illness Paternal Aunt x1     Depression Maternal Grandmother      Bipolar disorder Maternal Grandmother      Leukemia Maternal Grandfather          Adult Leukemia - in Remission    Depression Paternal Grandfather      Suicidality Paternal Grandfather      Early death Paternal Grandfather      Cancer Other Mat DZ     Depression Other Mat DZ     Bipolar disorder Other Mat DZ     Mental illness Other Mat DZ      Stroke Other Mat DZ     Hypertension Other Mat DZ     Depression Other Pat DZ     Bipolar disorder Other Pat DZ     Mental illness Other Pat DZ       Social History     Socioeconomic History    Marital status: Single   Tobacco Use    Smoking status: Never     Passive exposure: Yes    Smokeless tobacco: Never   Substance and Sexual Activity    Alcohol use: No    Drug use: No    Sexual activity: Never   Social History Narrative    SOC. HX:  Parents . Lives with Mom in Apartment in [place]. Mom & Dad have family in the area. Mom evacuated for Hurricane [place] and returned at the end of June, 09 from [place]. Some support from Mom's & Dad's families. Mom is a  in [facility]. Dad not too involved. + Smoker -- Mom, outside. + Pets -- 1 cat, 1 dog, inside.        EDU: Is Not  In School At This Time. Good grades: YES, C's-B's, possible F's; Good conduct: YES; Lots of friends: YES.        SAFETY:  Wears seatbelt 100% of time. GUNS -- None.      Review of patient's allergies indicates:   Allergen Reactions    Penicillins            Physical Examination     Initial Vitals [04/14/25 0003]   BP Pulse Resp Temp SpO2   (!) 146/83 98 16 98.4 °F (36.9 °C) 99 %      MAP       --           Physical Exam    Nursing note and vitals reviewed.  Constitutional: He is not diaphoretic. No distress.   HENT: Mouth/Throat: Oropharynx is clear and moist.   Eyes: No scleral icterus.   Cardiovascular:  Normal rate, regular rhythm and normal heart sounds.     Exam reveals no gallop and no friction rub.       No murmur heard.  Pulmonary/Chest: No respiratory distress. He has no wheezes. He has no rhonchi. He has no rales.   Abdominal: Abdomen is soft. Bowel sounds are normal. He exhibits no distension. There is abdominal tenderness in the epigastric area and left upper quadrant. There is no guarding.     Neurological: He is alert and oriented to person, place, and time. GCS score is 15. GCS eye subscore is 4. GCS verbal subscore is  5. GCS motor subscore is 6.   Skin: Skin is warm and dry. No pallor.   Psychiatric: He has a normal mood and affect. His speech is normal and behavior is normal. He is not actively hallucinating. He is attentive.            Labs     Labs Reviewed   COMPREHENSIVE METABOLIC PANEL - Abnormal       Result Value    Sodium 142      Potassium 3.9      Chloride 107      CO2 25      Glucose 94      BUN 21 (*)     Creatinine 1.6 (*)     Calcium 9.6      Protein Total 7.5      Albumin 3.7      Bilirubin Total 0.3      ALP 67      AST 23      ALT 28      Anion Gap 10      eGFR 60 (*)    LIPASE - Abnormal    Lipase Level 86 (*)    URINALYSIS, REFLEX TO URINE CULTURE - Abnormal    Color, UA Yellow      Appearance, UA Clear      pH, UA 5.0      Spec Grav UA 1.010      Protein, UA Trace (*)     Glucose, UA Negative      Ketones, UA Negative      Bilirubin, UA Negative      Blood, UA Negative      Nitrites, UA Negative      Urobilinogen, UA Negative      Leukocyte Esterase, UA Negative     CBC WITH DIFFERENTIAL - Abnormal    WBC 9.24      RBC 4.84      HGB 13.2 (*)     HCT 40.8      MCV 84      MCH 27.3      MCHC 32.4      RDW 14.2      Platelet Count 295      MPV 10.3      Nucleated RBC 0      Neut % 62.2      Lymph % 27.2      Mono % 8.1      Eos % 1.8      Basophil % 0.4      Imm Grans % 0.3      Neut # 5.74      Lymph # 2.51      Mono # 0.75      Eos # 0.17      Baso # 0.04      Imm Grans # 0.03     ISTAT PROCEDURE - Abnormal    POC Glucose 92      POC BUN 21      POC Creatinine 1.9 (*)     POC Sodium 141      POC Potassium 3.9      POC Chloride 104      POC TCO2 (MEASURED) 32 (*)     POC Ionized Calcium 1.18      POC Hematocrit 39      Sample CLARE     CBC W/ AUTO DIFFERENTIAL    Narrative:     The following orders were created for panel order CBC W/ AUTO DIFFERENTIAL.  Procedure                               Abnormality         Status                     ---------                               -----------         ------                      CBC with Differential[[id number]]       Abnormal            Final result                 Please view results for these tests on the individual orders.   [facility] URINE HOLD        Imaging     Imaging Results              CT Abdomen Pelvis  Without Contrast (Edited Result - FINAL)  Result time 04/14/25 07:32:16      Addendum (preliminary) 1 of 1 by Brandon Bunn MD (04/14/25 07:32:16)      Mentioned in the body of the report, but not in the impression: Cholelithiasis versus biliary sludge.  No noncontrast CT findings of acute cholecystitis.      Electronically signed by: Brandon Bunn MD  Date:    04/14/2025  Time:    07:32                 Final result by Brandon Bunn MD (04/14/25 07:21:12)                   Impression:      No evidence of nephrolithiasis or obstructive uropathy.  Suggest correlation with urinalysis.    Age advanced degenerative changes in the spine.    Hepatosplenomegaly and hepatic steatosis.    Other incidental findings discussed in the body of the report.      Electronically signed by: Brandon Bunn MD  Date:    04/14/2025  Time:    07:21               Narrative:    EXAMINATION:  CT ABDOMEN PELVIS WITHOUT CONTRAST    CLINICAL HISTORY:  Flank pain, kidney stone suspected;Abdominal abscess/infection suspected;epigastic pain, nausea and vomiting, new MILEY, also have vague flank pain, r/o stone or other infectious process;    TECHNIQUE:  Low dose axial images, sagittal and coronal reformations were obtained from the lung bases to the pubic symphysis. Oral and IV contrast not administered.    COMPARISON:  None    FINDINGS:  Lower chest: Heart size is normal. Minimal ground-glass density in the medial right lung base (axial image 34), presumably benign in this young patient.  Calcified granuloma in the left lung base.  No consolidation or pleural fluid.  No pericardial effusion.    Abdomen:    Evaluation of the solid abdominal organs and bowel is limited in the absence of IV  contrast.    Liver is enlarged and demonstrates diffuse hepatic steatosis.  No contour deforming lesion identified on this noncontrast study.  Gallbladder is decompressed.  Small volume biliary sludge or noncalcified gallstones likely present.  No pericholecystic edema.  No intra-or extrahepatic biliary ductal dilatation.    Spleen is enlarged, measuring approximately 15 cm in length.  Pancreas is unremarkable.  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  Left adrenal gland is unremarkable.    Kidneys are symmetric.  No renal or ureteral stones. No hydronephrosis.  No asymmetric perinephric fat stranding.    No small bowel obstruction.  Normal appendix.  Colonic diverticulosis.  No inflammatory changes identified in bowel on this noncontrast study.    No abdominal free fluid or pneumoperitoneum.    Abdominal aorta is normal in course and caliber.    No bulky retroperitoneal lymphadenopathy.    Pelvis: Urinary bladder, rectum, and pelvic organs are unremarkable.  No free fluid in the pelvis.    Bones and soft tissues: No aggressive osseous lesions.  Mild age advanced degenerative changes in the spine.  Prominent posterior disc bulges in the lower thoracic spine and in the lumbar spine at L1-L2 and L2-L3, with probable associated central canal stenosis at these levels.  Minimal fat containing umbilical hernia.                                        ED Course     The patient received the following medications:  Medications   ondansetron injection 4 mg (4 mg Intravenous Given 4/14/25 0131)   lactated ringers bolus 1,000 mL (0 mLs Intravenous Stopped 4/14/25 0232)   aluminum-magnesium hydroxide-simethicone 200-200-20 mg/5 mL suspension 30 mL (30 mLs Oral Given 4/14/25 0224)   sucralfate 100 mg/mL suspension 1 g (1 g Oral Given 4/14/25 0223)   ondansetron injection 4 mg (4 mg Intravenous Given 4/14/25 0249)   acetaminophen tablet 1,000 mg (1,000 mg Oral Given 4/14/25 0249)    promethazine (PHENERGAN) 6.25 mg in 0.9% NaCl 50 mL IVPB (0 mg Intravenous Stopped 4/14/25 0608)   sodium chloride 0.9% bolus 500 mL 500 mL (0 mLs Intravenous Stopped 4/14/25 0612)   metoclopramide injection 10 mg (10 mg Intravenous Given 4/14/25 0912)   sodium chloride 0.9% bolus 500 mL 500 mL (0 mLs Intravenous Stopped 4/14/25 1012)           ED Course as of 04/15/25 0018   Mon Apr 14, 2025   0613 EKG with sinus rhythm, rate 71, no STEMI [NN]      ED Course User Index  [NN] Hillary Maloney MD        Medical Decision Making                 Medical Decision Making  Amount and/or Complexity of Data Reviewed  Labs: ordered.  Radiology: ordered.    Risk  OTC drugs.  Prescription drug management.              Diagnoses       ICD-10-CM ICD-9-CM   1. Nausea and vomiting  R11.2 787.01         Dispostion     Patient signed out to Dr. Maloney at shift change pending lab results and reassessment after treatment.     Jeremy Gibbs III, MD  04/15/25 0021

## 2025-04-14 NOTE — ED NOTES
Pt requesting to speak w// a physician- c/o continued nausea and feels dehydrated and is requesting IV fluids.

## 2025-04-14 NOTE — DISCHARGE INSTRUCTIONS
Your workup showed what appears to be in acute kidney injury although we do not have a baseline creatinine/kidney function.  It also showed gallstones on your gallbladder.    The recommendation was that you stay for IV fluids and continued symptom control, and recheck of your creatinine/kidney function but you decided to go home and declined admission to the hospital.    Please follow-up outpatient a referral was placed for Internal Medicine as well as General surgery.  You need to have your fact within a week to make sure it is normalizing.    At home you can take Tylenol for pain, Zofran for nausea or vomiting.    If you have continued vomiting inability tolerate fluids, new or worsening abdominal pain, fevers, or any other new, worsening worrisome symptoms please return immediately to the emergency department for re-evaluation.

## 2025-04-14 NOTE — ED NOTES
I-STAT Chem-8+ Results:   Value Reference Range   Sodium 141 136-145 mmol/L   Potassium  3.9 3.5-5.1 mmol/L   Chloride 104  mmol/L   Ionized Calcium 1.18 1.06-1.42 mmol/L   CO2 (measured) 32 23-29 mmol/L   Glucose 92  mg/dL   BUN 21 6-30 mg/dL   Creatinine 1.9 0.5-1.4 mg/dL   Hematocrit 39 36-54%

## 2025-04-22 ENCOUNTER — OFFICE VISIT (OUTPATIENT)
Dept: SURGERY | Facility: CLINIC | Age: 30
End: 2025-04-22
Payer: COMMERCIAL

## 2025-04-22 VITALS
HEIGHT: 71 IN | SYSTOLIC BLOOD PRESSURE: 167 MMHG | WEIGHT: 315 LBS | OXYGEN SATURATION: 97 % | HEART RATE: 89 BPM | BODY MASS INDEX: 44.1 KG/M2 | DIASTOLIC BLOOD PRESSURE: 102 MMHG

## 2025-04-22 DIAGNOSIS — K80.20 GALL STONES: ICD-10-CM

## 2025-04-22 PROCEDURE — 3077F SYST BP >= 140 MM HG: CPT | Mod: CPTII,S$GLB,, | Performed by: SURGERY

## 2025-04-22 PROCEDURE — 4010F ACE/ARB THERAPY RXD/TAKEN: CPT | Mod: CPTII,S$GLB,, | Performed by: SURGERY

## 2025-04-22 PROCEDURE — 99203 OFFICE O/P NEW LOW 30 MIN: CPT | Mod: S$GLB,,, | Performed by: SURGERY

## 2025-04-22 PROCEDURE — 3044F HG A1C LEVEL LT 7.0%: CPT | Mod: CPTII,S$GLB,, | Performed by: SURGERY

## 2025-04-22 PROCEDURE — 99999 PR PBB SHADOW E&M-EST. PATIENT-LVL V: CPT | Mod: PBBFAC,,, | Performed by: SURGERY

## 2025-04-22 PROCEDURE — 3008F BODY MASS INDEX DOCD: CPT | Mod: CPTII,S$GLB,, | Performed by: SURGERY

## 2025-04-22 PROCEDURE — 3080F DIAST BP >= 90 MM HG: CPT | Mod: CPTII,S$GLB,, | Performed by: SURGERY

## 2025-04-22 NOTE — H&P (VIEW-ONLY)
Acute Care Surgery: History & Physical    Chief Complaint: RUQ pain    Subjective:  Mr. Montero is a 29 y.o. male who presents for     On examination, Mr. Montero is sitting comfortably in the room upon entering in no acute distress and in good spirits. He denies headaches, malaise, shortness of breath, chest pain, fever, or nightsweats. He states that approximately 1 year ago he began to notice intermittent RUQ pain that he is unable to identify to any specific triggers. He states he will notice the pain if he shifts positions while sleeping or moving around about his day. 4/14 the pain was severe enough to prompt an ED visit where a CT scan was performed that showed no definitive evidence of cholecystitis but revealed biliary sludge vs cholelithiasis. Patient also endorsed one episode of emesis prior to the ED visit and nausea since then with no emesis. He also states that since his ED presentation he has been having diarrhea which is new for him. He denies any blood thinning medication, is not currently taking any injectable diabetes medication, has no smoking history, and has had no past abdominal surgeries.    PMHx, SHx, FHx, SocHx, Allergies, Medications:  Mr. Montero  has a past medical history of Acquired acanthosis nigricans, ADHD (attention deficit hyperactivity disorder), Anxiety, Diabetes mellitus, HTN (hypertension), Hyperlipidemia, mixed, Hypertension, Insulin resistance, Morbid obesity, Seasonal allergic rhinitis, Sleep disturbance, Tic disorder, and Vision abnormalities.     He  has a past surgical history that includes Circumcision, primary.    He  reports that he has never smoked. He has been exposed to tobacco smoke. He has never used smokeless tobacco. He reports that he does not drink alcohol and does not use drugs.     Mr. Montero's family history includes Anxiety disorder in his mother; Bipolar disorder in his maternal grandmother and other family members; Cancer in an other family member;  "Depression in his father, maternal grandmother, paternal aunt, paternal grandfather, and other family members; Early death in his paternal grandfather; Hypertension in an other family member; Leukemia in his maternal grandfather; Mental illness in his father, paternal aunt, and other family members; Obesity in his mother; Stroke in an other family member; Suicidality in his paternal grandfather.  He is allergic to penicillins.    Mr. Montero has a current medication list which includes the following prescription(s): amlodipine, atorvastatin, bupropion, ibuprofen, losartan, melatonin, metformin, ondansetron, colchicine, diclofenac sodium, lisdexamfetamine, and ozempic.    Review of Systems   Constitutional:  Negative for chills and fever.   Respiratory:  Negative for cough and shortness of breath.    Cardiovascular:  Negative for chest pain and palpitations.   Gastrointestinal:  Positive for abdominal pain. Negative for nausea and vomiting.   Genitourinary:  Negative for dysuria and hematuria.   Musculoskeletal:  Negative for back pain and gait problem.   Skin:  Negative for color change, rash and wound.   Neurological:  Negative for weakness and headaches.   Hematological:  Negative for adenopathy. Does not bruise/bleed easily.   Psychiatric/Behavioral:  Negative for agitation and confusion.         Objective:  BP (!) 167/102 (BP Location: Left arm, Patient Position: Sitting)   Pulse 89   Ht 5' 11" (1.803 m)   Wt (!) 144.2 kg (317 lb 12.7 oz)   SpO2 97%   BMI 44.32 kg/m²     Physical Exam:  Gen: no acute distress, alert and oriented  HEENT: extraocular movements intact   CV: regular rate and rhythm, bilateral radial pulses 2+    Pulm: no increased work of breathing, symmetrical chest rise  Abd: Soft, RUQ tenderness worse with inspiration.  Extr: moves all extremities well  Neuro: CN II-XII grossly intact w/o focal deficit  Integument: Normal turgor and tone. No jaundice.  Psych: appropriate affect, normal " speech    Medical Decision Making:  Data reviewed during evaluation of Mr. Montero includes lab results, personal interpretation of imaging results, and other practitioner's notes and charts prior to this encounter.     Assessment:   Mr. Montero is a 29 y.o. male who presents as consult from ED for RUQ pain. CT reveals evidence of possible cholelithiasis vs biliary sludge and patient symptomology fits with biliary colic.    After discussing the alternatives, benefits, and risks for the proposed operation, Mr. Montero wished to proceed. All of Mr. Montero questions concerning the operation were answered, he expressed full understanding of the procedure and the risks/benefits associated, and signed informed consent was obtained.    Plan:  -OR for Lap vs open Zeny   - date to be scheduled with staff.  -informed consent obtained     Gustavo Araujo MD  Acute Care Surgery  [facility] [place] Department of Surgery

## 2025-04-22 NOTE — PROGRESS NOTES
Acute Care Surgery: History & Physical    Chief Complaint: RUQ pain    Subjective:  Mr. Montero is a 29 y.o. male who presents for     On examination, Mr. Montero is sitting comfortably in the room upon entering in no acute distress and in good spirits. He denies headaches, malaise, shortness of breath, chest pain, fever, or nightsweats. He states that approximately 1 year ago he began to notice intermittent RUQ pain that he is unable to identify to any specific triggers. He states he will notice the pain if he shifts positions while sleeping or moving around about his day. 4/14 the pain was severe enough to prompt an ED visit where a CT scan was performed that showed no definitive evidence of cholecystitis but revealed biliary sludge vs cholelithiasis. Patient also endorsed one episode of emesis prior to the ED visit and nausea since then with no emesis. He also states that since his ED presentation he has been having diarrhea which is new for him. He denies any blood thinning medication, is not currently taking any injectable diabetes medication, has no smoking history, and has had no past abdominal surgeries.    PMHx, SHx, FHx, SocHx, Allergies, Medications:  Mr. Montero  has a past medical history of Acquired acanthosis nigricans, ADHD (attention deficit hyperactivity disorder), Anxiety, Diabetes mellitus, HTN (hypertension), Hyperlipidemia, mixed, Hypertension, Insulin resistance, Morbid obesity, Seasonal allergic rhinitis, Sleep disturbance, Tic disorder, and Vision abnormalities.     He  has a past surgical history that includes Circumcision, primary.    He  reports that he has never smoked. He has been exposed to tobacco smoke. He has never used smokeless tobacco. He reports that he does not drink alcohol and does not use drugs.     Mr. Montero's family history includes Anxiety disorder in his mother; Bipolar disorder in his maternal grandmother and other family members; Cancer in an other family member;  "Depression in his father, maternal grandmother, paternal aunt, paternal grandfather, and other family members; Early death in his paternal grandfather; Hypertension in an other family member; Leukemia in his maternal grandfather; Mental illness in his father, paternal aunt, and other family members; Obesity in his mother; Stroke in an other family member; Suicidality in his paternal grandfather.  He is allergic to penicillins.    Mr. Montero has a current medication list which includes the following prescription(s): amlodipine, atorvastatin, bupropion, ibuprofen, losartan, melatonin, metformin, ondansetron, colchicine, diclofenac sodium, lisdexamfetamine, and ozempic.    Review of Systems   Constitutional:  Negative for chills and fever.   Respiratory:  Negative for cough and shortness of breath.    Cardiovascular:  Negative for chest pain and palpitations.   Gastrointestinal:  Positive for abdominal pain. Negative for nausea and vomiting.   Genitourinary:  Negative for dysuria and hematuria.   Musculoskeletal:  Negative for back pain and gait problem.   Skin:  Negative for color change, rash and wound.   Neurological:  Negative for weakness and headaches.   Hematological:  Negative for adenopathy. Does not bruise/bleed easily.   Psychiatric/Behavioral:  Negative for agitation and confusion.         Objective:  BP (!) 167/102 (BP Location: Left arm, Patient Position: Sitting)   Pulse 89   Ht 5' 11" (1.803 m)   Wt (!) 144.2 kg (317 lb 12.7 oz)   SpO2 97%   BMI 44.32 kg/m²     Physical Exam:  Gen: no acute distress, alert and oriented  HEENT: extraocular movements intact   CV: regular rate and rhythm, bilateral radial pulses 2+    Pulm: no increased work of breathing, symmetrical chest rise  Abd: Soft, RUQ tenderness worse with inspiration.  Extr: moves all extremities well  Neuro: CN II-XII grossly intact w/o focal deficit  Integument: Normal turgor and tone. No jaundice.  Psych: appropriate affect, normal " speech    Medical Decision Making:  Data reviewed during evaluation of Mr. Montero includes lab results, personal interpretation of imaging results, and other practitioner's notes and charts prior to this encounter.     Assessment:   Mr. Montero is a 29 y.o. male who presents as consult from ED for RUQ pain. CT reveals evidence of possible cholelithiasis vs biliary sludge and patient symptomology fits with biliary colic.    After discussing the alternatives, benefits, and risks for the proposed operation, Mr. Montero wished to proceed. All of Mr. Montero questions concerning the operation were answered, he expressed full understanding of the procedure and the risks/benefits associated, and signed informed consent was obtained.    Plan:  -OR for Lap vs open Zeny   - date to be scheduled with staff.  -informed consent obtained     Gustavo Araujo MD  Acute Care Surgery  [facility] [place] Department of Surgery

## 2025-04-24 ENCOUNTER — TELEPHONE (OUTPATIENT)
Dept: SURGERY | Facility: CLINIC | Age: 30
End: 2025-04-24
Payer: COMMERCIAL

## 2025-04-24 NOTE — TELEPHONE ENCOUNTER
Left message on patient's voicemail that call was returned.  Notified him that his disability paperwork has been forwarded to the Disability Office for completion. He will be notified when done and signed.

## 2025-04-28 ENCOUNTER — LAB VISIT (OUTPATIENT)
Dept: LAB | Facility: HOSPITAL | Age: 30
End: 2025-04-28
Attending: INTERNAL MEDICINE
Payer: COMMERCIAL

## 2025-04-28 ENCOUNTER — OFFICE VISIT (OUTPATIENT)
Dept: INTERNAL MEDICINE | Facility: CLINIC | Age: 30
End: 2025-04-28
Payer: COMMERCIAL

## 2025-04-28 VITALS
BODY MASS INDEX: 44.1 KG/M2 | HEIGHT: 71 IN | DIASTOLIC BLOOD PRESSURE: 86 MMHG | OXYGEN SATURATION: 97 % | SYSTOLIC BLOOD PRESSURE: 144 MMHG | WEIGHT: 315 LBS | HEART RATE: 85 BPM

## 2025-04-28 DIAGNOSIS — E11.59 HYPERTENSION ASSOCIATED WITH TYPE 2 DIABETES MELLITUS: ICD-10-CM

## 2025-04-28 DIAGNOSIS — E11.9 TYPE 2 DIABETES MELLITUS WITHOUT COMPLICATION, WITHOUT LONG-TERM CURRENT USE OF INSULIN: ICD-10-CM

## 2025-04-28 DIAGNOSIS — I15.2 HYPERTENSION ASSOCIATED WITH TYPE 2 DIABETES MELLITUS: ICD-10-CM

## 2025-04-28 DIAGNOSIS — E78.5 HYPERLIPIDEMIA ASSOCIATED WITH TYPE 2 DIABETES MELLITUS: ICD-10-CM

## 2025-04-28 DIAGNOSIS — Z00.00 ANNUAL PHYSICAL EXAM: Primary | ICD-10-CM

## 2025-04-28 DIAGNOSIS — Z00.00 ANNUAL PHYSICAL EXAM: ICD-10-CM

## 2025-04-28 DIAGNOSIS — E11.69 HYPERLIPIDEMIA ASSOCIATED WITH TYPE 2 DIABETES MELLITUS: ICD-10-CM

## 2025-04-28 DIAGNOSIS — R11.2 NAUSEA AND VOMITING, UNSPECIFIED VOMITING TYPE: ICD-10-CM

## 2025-04-28 LAB
ALBUMIN SERPL BCP-MCNC: 3.8 G/DL (ref 3.5–5.2)
ALP SERPL-CCNC: 67 UNIT/L (ref 40–150)
ALT SERPL W/O P-5'-P-CCNC: 33 UNIT/L (ref 10–44)
ANION GAP (OHS): 9 MMOL/L (ref 8–16)
AST SERPL-CCNC: 21 UNIT/L (ref 11–45)
BILIRUB SERPL-MCNC: 0.2 MG/DL (ref 0.1–1)
BUN SERPL-MCNC: 18 MG/DL (ref 6–20)
CALCIUM SERPL-MCNC: 10.5 MG/DL (ref 8.7–10.5)
CHLORIDE SERPL-SCNC: 105 MMOL/L (ref 95–110)
CHOLEST SERPL-MCNC: 169 MG/DL (ref 120–199)
CHOLEST/HDLC SERPL: 5.3 {RATIO} (ref 2–5)
CO2 SERPL-SCNC: 26 MMOL/L (ref 23–29)
CREAT SERPL-MCNC: 0.9 MG/DL (ref 0.5–1.4)
EAG (OHS): 131 MG/DL (ref 68–131)
GFR SERPLBLD CREATININE-BSD FMLA CKD-EPI: >60 ML/MIN/1.73/M2
GLUCOSE SERPL-MCNC: 135 MG/DL (ref 70–110)
HBA1C MFR BLD: 6.2 % (ref 4–5.6)
HDLC SERPL-MCNC: 32 MG/DL (ref 40–75)
HDLC SERPL: 18.9 % (ref 20–50)
LDLC SERPL CALC-MCNC: 86.6 MG/DL (ref 63–159)
NONHDLC SERPL-MCNC: 137 MG/DL
POTASSIUM SERPL-SCNC: 4.7 MMOL/L (ref 3.5–5.1)
PROT SERPL-MCNC: 8 GM/DL (ref 6–8.4)
SODIUM SERPL-SCNC: 140 MMOL/L (ref 136–145)
TRIGL SERPL-MCNC: 252 MG/DL (ref 30–150)

## 2025-04-28 PROCEDURE — 99395 PREV VISIT EST AGE 18-39: CPT | Mod: S$GLB,,, | Performed by: INTERNAL MEDICINE

## 2025-04-28 PROCEDURE — 83036 HEMOGLOBIN GLYCOSYLATED A1C: CPT

## 2025-04-28 PROCEDURE — 36415 COLL VENOUS BLD VENIPUNCTURE: CPT

## 2025-04-28 PROCEDURE — 3077F SYST BP >= 140 MM HG: CPT | Mod: CPTII,S$GLB,, | Performed by: INTERNAL MEDICINE

## 2025-04-28 PROCEDURE — 1160F RVW MEDS BY RX/DR IN RCRD: CPT | Mod: CPTII,S$GLB,, | Performed by: INTERNAL MEDICINE

## 2025-04-28 PROCEDURE — 80061 LIPID PANEL: CPT

## 2025-04-28 PROCEDURE — 80053 COMPREHEN METABOLIC PANEL: CPT

## 2025-04-28 PROCEDURE — 4010F ACE/ARB THERAPY RXD/TAKEN: CPT | Mod: CPTII,S$GLB,, | Performed by: INTERNAL MEDICINE

## 2025-04-28 PROCEDURE — 3079F DIAST BP 80-89 MM HG: CPT | Mod: CPTII,S$GLB,, | Performed by: INTERNAL MEDICINE

## 2025-04-28 PROCEDURE — 3008F BODY MASS INDEX DOCD: CPT | Mod: CPTII,S$GLB,, | Performed by: INTERNAL MEDICINE

## 2025-04-28 PROCEDURE — 1159F MED LIST DOCD IN RCRD: CPT | Mod: CPTII,S$GLB,, | Performed by: INTERNAL MEDICINE

## 2025-04-28 PROCEDURE — 99999 PR PBB SHADOW E&M-EST. PATIENT-LVL IV: CPT | Mod: PBBFAC,,, | Performed by: INTERNAL MEDICINE

## 2025-04-28 RX ORDER — ONDANSETRON 4 MG/1
4 TABLET, ORALLY DISINTEGRATING ORAL EVERY 6 HOURS PRN
Qty: 30 TABLET | Refills: 0 | Status: SHIPPED | OUTPATIENT
Start: 2025-04-28

## 2025-04-28 RX ORDER — AMLODIPINE BESYLATE AND OLMESARTAN MEDOXOMIL 10; 40 MG/1; MG/1
1 TABLET ORAL DAILY
Qty: 90 TABLET | Refills: 3 | Status: SHIPPED | OUTPATIENT
Start: 2025-04-28 | End: 2026-04-28

## 2025-04-28 NOTE — PROGRESS NOTES
"  Attestation signed by Libertad, Rufino PICKERING MD at 4/28/2025  4:58 PM     I have seen the patient, reviewed the  [employer]  progress note. I have personally interviewed and examined the patient at bedside and agree with the findings.     Patient with type 2 diabetes, obesity hypertension and anxiety here today for annual exam.  Currently dealing with symptomatic biliary colic.  Using Zofran for nausea scheduled for surgery May 12th.  Blood pressure has been running high at home     Blood pressure (!) 144/86, pulse 85, height 5' 11" (1.803 m), weight (!) 143.2 kg (315 lb 11.2 oz), SpO2 97%.  Notable for acanthosis nigricans and obesity  Heart and lung exam benign  Abdomen right upper quadrant tenderness     1. Annual physical exam (Primary)  Updated problem list, medical history, care team and discussed HM.   Updated labs  - Comprehensive Metabolic Panel; Future  - Hemoglobin A1C; Future  - Lipid Panel; Future     2. Type 2 diabetes mellitus without complication, without long-term current use of insulin  Continue metformin  Recheck A1c  Would be a good candidate for G LP 1 agonist once he recovers from gallbladder surgery  - Comprehensive Metabolic Panel; Future  - Hemoglobin A1C; Future     3. Nausea and vomiting, unspecified vomiting type  Will treat symptomatically with Zofran while awaiting surgery  - Ambulatory referral/consult to Internal Medicine  - ondansetron (ZOFRAN-ODT) 4 MG TbDL; Take 1 tablet (4 mg total) by mouth every 6 (six) hours as needed (nausea/vomiting).  Dispense: 30 tablet; Refill: 0     4. Hypertension associated with type 2 diabetes mellitus  Not controlled Will switch from losartan 100 to olmesartan 40  Continue amlodipine 10  - amlodipine-olmesartan (HEMA) 10-40 mg per tablet; Take 1 tablet by mouth once daily.  Dispense: 90 tablet; Refill: 3  - Comprehensive Metabolic Panel; Future     5. Hyperlipidemia associated with type 2 diabetes mellitus  Continue atorvastatin 20  - Lipid Panel; " Future     Will have him follow-up with me in June     Rufino Maurer MD  Internal Medicine  [facility]

## 2025-04-28 NOTE — PROGRESS NOTES
CHIEF COMPLAINT     Chief Complaint   Patient presents with    Establish Care       HPI     Ovidio Montero Jr. is a 29 y.o. male here today for establishment of new PCP.     Patient is scheduled for an upcoming gallbladder surgery on 5/12/2025 for possible cholelithiasis vs biliary sludge. He is currently still having some upper abdominal pain, vomiting, and diarrhea. He has been managing his vomiting with Zofran, which he is running low on. Has been taking pepto bismol for his diarrhea, which has not helped. He has also been out of work since the onset of his symptoms because of his pain and diarrhea. He would like some extra time off to take care of these issues.    He has a newer diagnosis of T2D, for which he is currently not taking any medication for. He has trialed ozempic in the past, which during the time was mainly for weight loss; however this made him nauseous when they increased his dose. Regarding his hypertension, he is currently taking losartan 100mg and amlodipine 5mg; however, his blood pressure has been ranging around 140/80. He is currently not taking any medication for his ADHD as he feels his symptoms do not need to be treated currently. He has been managing his seasonal allergic rhinitis with OTC medications. He is currently trying to increase his exercise and better his diet as listed below.     Health Maintenance:   Reviewed with patient  Due for the following: none currently. Up to date.     Exercise: walking a lot, past 6 months: 3000x steps 40268 on days he works, working on weight lifting.   Sleep: Uses melatonin to sleep about 3-4 days/week. 8-10 hours of sleep with melatonin. Has to sleep in the morning. Works at night. Delayed onset sleep.   Diet: Sometimes better weeks, sometimes orders out.  Does watch what he eats and. Interested in meeting with nutritionist; virtual or in person around noon time.   Transportation: does not have car, uses family to help get around.  "Prefers times around noon for appointments. Uber and Lyft when he has to.    Past Medical History:   Diagnosis Date    Acquired acanthosis nigricans     ADHD (attention deficit hyperactivity disorder)     Dr. Champagne    Anxiety     Diabetes mellitus     HTN (hypertension)     Hyperlipidemia, mixed     Hypertension     Insulin resistance     Morbid obesity     Seasonal allergic rhinitis     Sleep disturbance     Dr. Champagne, counseling    Tic disorder     Tic disorder 05/03/2012    Vision abnormalities     Myopia & Astigmatism     Medication list has been reviewed and reconciled.     Allergies:  Penicillins    Social hx:  Smoking: none  Alcohol: none; his father he believes did use too drink a lot, however, he is no longer in the picture  Drug Use: none  Housing: Lives with his Mom in a house.    Family hx:  Dad has liver issues possibly. Possible bipolar. Possible alcohol addiction. Not in picture  .  Mom has had two strokes.  Paternal grandmother had lung cancer.      Review of Systems:  Review of Systems   Constitutional:  Positive for appetite change and fatigue. Negative for chills, diaphoresis and fever.   HENT:  Negative for sore throat and trouble swallowing.    Respiratory:  Negative for cough, choking, chest tightness, shortness of breath, wheezing and stridor.    Cardiovascular:  Negative for chest pain, palpitations and leg swelling.   Gastrointestinal:  Positive for abdominal pain (epigastric and deep pain), constipation, diarrhea, nausea and vomiting. Negative for blood in stool.   Endocrine: Negative for cold intolerance, heat intolerance and polyuria.   Genitourinary:  Negative for dysuria, flank pain and frequency.   Musculoskeletal:  Negative for arthralgias and back pain.   Psychiatric/Behavioral:  Positive for sleep disturbance. The patient is nervous/anxious.        PHYSICAL EXAM     BP (!) 144/86 (BP Location: Right arm, Patient Position: Sitting)   Pulse 85   Ht 5' 11" (1.803 m)   Wt (!) 143.2 " kg (315 lb 11.2 oz)   SpO2 97%   BMI 44.03 kg/m²     Physical Exam  Constitutional:       Appearance: Normal appearance.   HENT:      Head: Normocephalic.   Cardiovascular:      Rate and Rhythm: Normal rate and regular rhythm.   Pulmonary:      Effort: Pulmonary effort is normal.      Breath sounds: Normal breath sounds.   Abdominal:      General: Abdomen is flat. Bowel sounds are normal. There is no distension.      Palpations: Abdomen is soft. There is no mass.      Tenderness: There is abdominal tenderness (mild tenderness to the upper quadrants). There is no right CVA tenderness, left CVA tenderness, guarding or rebound.      Hernia: No hernia is present.   Skin:     Coloration: Skin is not jaundiced.   Neurological:      General: No focal deficit present.      Mental Status: He is alert and oriented to person, place, and time.   Psychiatric:         Mood and Affect: Mood normal.         Behavior: Behavior normal.           LABS     Labs reviewed; Notable for    ASSESSMENT     1. Annual physical exam  Comprehensive Metabolic Panel    Hemoglobin A1C    Lipid Panel      2. Type 2 diabetes mellitus without complication, without long-term current use of insulin  Comprehensive Metabolic Panel    Hemoglobin A1C      3. Nausea and vomiting, unspecified vomiting type  Ambulatory referral/consult to Internal Medicine    ondansetron (ZOFRAN-ODT) 4 MG TbDL      4. Hypertension associated with type 2 diabetes mellitus  amlodipine-olmesartan (HEMA) 10-40 mg per tablet    Comprehensive Metabolic Panel      5. Hyperlipidemia associated with type 2 diabetes mellitus  Lipid Panel              Plan   Ovidio Jason Winston Guo is a 29 y.o. male with    1. Annual physical exam    - Comprehensive Metabolic Panel; Future  - Hemoglobin A1C; Future  - Lipid Panel; Future    2. Type 2 diabetes mellitus without complication, without long-term current use of insulin  He has had two prior readings that have shown a hgb A1c of greater  than 6.5.     - Comprehensive Metabolic Panel; Future  - Hemoglobin A1C; Future  - Continue metformin 500mg   - talked about importance of exercise diet and sleep  - will re-discuss referral for nutrition after surgery    3. Nausea and vomiting, unspecified vomiting type  Ovidio is currently having vomiting secondary to gallbladder issues. He is scheduled to have his gallbladder surgery on 5/18.     - Ambulatory referral/consult to Internal Medicine  - Ondansetron (ZOFRAN-ODT) 4 MG TbDL; Take 1 tablet (4 mg total) by mouth every 6 (six) hours as needed (nausea/vomiting).  Dispense: 30 tablet; Refill:     4. Hypertension associated with type 2 diabetes mellitus  Ovidio is currently at a blood pressure today of 144/86, which has been consistent with previous readings. To further help with his blood pressure, we have changed to the following.    - changing amlodipine 5mg and losartan 100mg to amlodipine-olmesartan (HEMA) 10-40 mg per tablet; Take 1 tablet by mouth once daily.  Dispense: 90 tablet; Refill: 3  - Comprehensive Metabolic Panel; Future  - talked about importance of exercise diet and sleep  - will re-discuss referral for nutrition after surgery    5. Hyperlipidemia associated with type 2 diabetes mellitus    - Lipid Panel; Future  - Continue Atorvastatin 20mg  - talked about importance of exercise diet and sleep  - will re-discuss referral for nutrition after surgery    7. 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 He sleeps in the morning and sometimes has trouble falling asleep. Has been well controlled with melatonin.    - continue taking melatonin as needed    8. Diarrhea, unspecified type  Has been having diarrhea secondary to his biliary issues. Taking pepto bismol without relief.    - Not currently interested in taking bile acid resin or any extra medications.   - Will reassess after surgery      Shyam Ruelas MS4

## 2025-04-29 ENCOUNTER — RESULTS FOLLOW-UP (OUTPATIENT)
Dept: INTERNAL MEDICINE | Facility: CLINIC | Age: 30
End: 2025-04-29

## 2025-05-08 ENCOUNTER — TELEPHONE (OUTPATIENT)
Dept: SURGERY | Facility: CLINIC | Age: 30
End: 2025-05-08
Payer: COMMERCIAL

## 2025-05-08 ENCOUNTER — PATIENT MESSAGE (OUTPATIENT)
Dept: SURGERY | Facility: CLINIC | Age: 30
End: 2025-05-08
Payer: COMMERCIAL

## 2025-05-08 ENCOUNTER — PATIENT MESSAGE (OUTPATIENT)
Dept: PREADMISSION TESTING | Facility: HOSPITAL | Age: 30
End: 2025-05-08
Payer: COMMERCIAL

## 2025-05-08 NOTE — TELEPHONE ENCOUNTER
Unable to reach patient by phone.  No answer - no voicemail.  Left message on patient's My [facility] Portal for him to arrive at the [facility] Monday 5/12/25 at 05:45am for surgery with Dr. Araujo.  Pre-Op instructions reinforced.  Direct phone number given for him to call back with any questions or concerns.

## 2025-05-12 ENCOUNTER — ANESTHESIA (OUTPATIENT)
Dept: SURGERY | Facility: HOSPITAL | Age: 30
End: 2025-05-12
Payer: COMMERCIAL

## 2025-05-12 ENCOUNTER — ANESTHESIA EVENT (OUTPATIENT)
Dept: SURGERY | Facility: HOSPITAL | Age: 30
End: 2025-05-12
Payer: COMMERCIAL

## 2025-05-12 ENCOUNTER — HOSPITAL ENCOUNTER (OUTPATIENT)
Facility: HOSPITAL | Age: 30
Discharge: HOME | End: 2025-05-12
Attending: SURGERY | Admitting: SURGERY
Payer: COMMERCIAL

## 2025-05-12 VITALS
RESPIRATION RATE: 20 BRPM | HEIGHT: 71 IN | WEIGHT: 315 LBS | DIASTOLIC BLOOD PRESSURE: 78 MMHG | TEMPERATURE: 99 F | OXYGEN SATURATION: 92 % | SYSTOLIC BLOOD PRESSURE: 157 MMHG | HEART RATE: 99 BPM | BODY MASS INDEX: 44.1 KG/M2

## 2025-05-12 DIAGNOSIS — K80.20 GALLSTONES: ICD-10-CM

## 2025-05-12 DIAGNOSIS — K80.20 GALL STONES: ICD-10-CM

## 2025-05-12 LAB
POCT GLUCOSE: 179 MG/DL (ref 70–110)
POCT GLUCOSE: 94 MG/DL (ref 70–110)

## 2025-05-12 PROCEDURE — 71000015 HC POSTOP RECOV 1ST HR: Performed by: SURGERY

## 2025-05-12 PROCEDURE — 63600175 PHARM REV CODE 636 W HCPCS: Performed by: STUDENT IN AN ORGANIZED HEALTH CARE EDUCATION/TRAINING PROGRAM

## 2025-05-12 PROCEDURE — 71000039 HC RECOVERY, EACH ADD'L HOUR: Performed by: SURGERY

## 2025-05-12 PROCEDURE — 94660 CPAP INITIATION&MGMT: CPT

## 2025-05-12 PROCEDURE — 88304 TISSUE EXAM BY PATHOLOGIST: CPT | Mod: TC,59 | Performed by: SURGERY

## 2025-05-12 PROCEDURE — 37000008 HC ANESTHESIA 1ST 15 MINUTES: Performed by: SURGERY

## 2025-05-12 PROCEDURE — 36000709 HC OR TIME LEV III EA ADD 15 MIN: Performed by: SURGERY

## 2025-05-12 PROCEDURE — 47562 LAPAROSCOPIC CHOLECYSTECTOMY: CPT | Mod: ,,, | Performed by: SURGERY

## 2025-05-12 PROCEDURE — 27100171 HC OXYGEN HIGH FLOW UP TO 24 HOURS

## 2025-05-12 PROCEDURE — 71000033 HC RECOVERY, INTIAL HOUR: Performed by: SURGERY

## 2025-05-12 PROCEDURE — 82962 GLUCOSE BLOOD TEST: CPT | Performed by: SURGERY

## 2025-05-12 PROCEDURE — 25000003 PHARM REV CODE 250: Performed by: STUDENT IN AN ORGANIZED HEALTH CARE EDUCATION/TRAINING PROGRAM

## 2025-05-12 PROCEDURE — 63600175 PHARM REV CODE 636 W HCPCS: Performed by: ANESTHESIOLOGY

## 2025-05-12 PROCEDURE — 27201423 OPTIME MED/SURG SUP & DEVICES STERILE SUPPLY: Performed by: SURGERY

## 2025-05-12 PROCEDURE — 27000190 HC CPAP FULL FACE MASK W/VALVE

## 2025-05-12 PROCEDURE — 25000003 PHARM REV CODE 250: Performed by: ANESTHESIOLOGY

## 2025-05-12 PROCEDURE — 63600175 PHARM REV CODE 636 W HCPCS: Performed by: SURGERY

## 2025-05-12 PROCEDURE — 94761 N-INVAS EAR/PLS OXIMETRY MLT: CPT

## 2025-05-12 PROCEDURE — 37000009 HC ANESTHESIA EA ADD 15 MINS: Performed by: SURGERY

## 2025-05-12 PROCEDURE — 36000708 HC OR TIME LEV III 1ST 15 MIN: Performed by: SURGERY

## 2025-05-12 RX ORDER — HALOPERIDOL LACTATE 5 MG/ML
0.5 INJECTION, SOLUTION INTRAMUSCULAR EVERY 10 MIN PRN
Status: DISCONTINUED | OUTPATIENT
Start: 2025-05-12 | End: 2025-05-12 | Stop reason: HOSPADM

## 2025-05-12 RX ORDER — PROPOFOL 10 MG/ML
VIAL (ML) INTRAVENOUS
Status: DISCONTINUED | OUTPATIENT
Start: 2025-05-12 | End: 2025-05-12

## 2025-05-12 RX ORDER — KETAMINE HCL IN 0.9 % NACL 50 MG/5 ML
SYRINGE (ML) INTRAVENOUS
Status: DISCONTINUED | OUTPATIENT
Start: 2025-05-12 | End: 2025-05-12

## 2025-05-12 RX ORDER — ACETAMINOPHEN 10 MG/ML
1000 INJECTION, SOLUTION INTRAVENOUS ONCE
Status: COMPLETED | OUTPATIENT
Start: 2025-05-12 | End: 2025-05-12

## 2025-05-12 RX ORDER — MIDAZOLAM HYDROCHLORIDE 1 MG/ML
INJECTION INTRAMUSCULAR; INTRAVENOUS
Status: DISCONTINUED | OUTPATIENT
Start: 2025-05-12 | End: 2025-05-12

## 2025-05-12 RX ORDER — BUPIVACAINE HYDROCHLORIDE 5 MG/ML
INJECTION, SOLUTION EPIDURAL; INTRACAUDAL; PERINEURAL
Status: DISCONTINUED | OUTPATIENT
Start: 2025-05-12 | End: 2025-05-12 | Stop reason: HOSPADM

## 2025-05-12 RX ORDER — ENOXAPARIN SODIUM 100 MG/ML
40 INJECTION SUBCUTANEOUS ONCE
Status: COMPLETED | OUTPATIENT
Start: 2025-05-12 | End: 2025-05-12

## 2025-05-12 RX ORDER — OXYCODONE HYDROCHLORIDE 5 MG/1
5 TABLET ORAL EVERY 6 HOURS PRN
Qty: 12 TABLET | Refills: 0 | Status: SHIPPED | OUTPATIENT
Start: 2025-05-12

## 2025-05-12 RX ORDER — CEFAZOLIN SODIUM 1 G/3ML
INJECTION, POWDER, FOR SOLUTION INTRAMUSCULAR; INTRAVENOUS
Status: DISCONTINUED | OUTPATIENT
Start: 2025-05-12 | End: 2025-05-12

## 2025-05-12 RX ORDER — DEXAMETHASONE SODIUM PHOSPHATE 4 MG/ML
INJECTION, SOLUTION INTRA-ARTICULAR; INTRALESIONAL; INTRAMUSCULAR; INTRAVENOUS; SOFT TISSUE
Status: DISCONTINUED | OUTPATIENT
Start: 2025-05-12 | End: 2025-05-12

## 2025-05-12 RX ORDER — FENTANYL CITRATE 50 UG/ML
INJECTION, SOLUTION INTRAMUSCULAR; INTRAVENOUS
Status: DISCONTINUED | OUTPATIENT
Start: 2025-05-12 | End: 2025-05-12

## 2025-05-12 RX ORDER — CLINDAMYCIN PHOSPHATE 900 MG/50ML
900 INJECTION, SOLUTION INTRAVENOUS
Status: DISCONTINUED | OUTPATIENT
Start: 2025-05-12 | End: 2025-05-12 | Stop reason: HOSPADM

## 2025-05-12 RX ORDER — SUCCINYLCHOLINE CHLORIDE 20 MG/ML
INJECTION INTRAMUSCULAR; INTRAVENOUS
Status: DISCONTINUED | OUTPATIENT
Start: 2025-05-12 | End: 2025-05-12

## 2025-05-12 RX ORDER — GLUCAGON 1 MG
1 KIT INJECTION
Status: DISCONTINUED | OUTPATIENT
Start: 2025-05-12 | End: 2025-05-12 | Stop reason: HOSPADM

## 2025-05-12 RX ORDER — ROCURONIUM BROMIDE 10 MG/ML
INJECTION, SOLUTION INTRAVENOUS
Status: DISCONTINUED | OUTPATIENT
Start: 2025-05-12 | End: 2025-05-12

## 2025-05-12 RX ORDER — SODIUM CHLORIDE 9 MG/ML
INJECTION, SOLUTION INTRAVENOUS CONTINUOUS
Status: DISCONTINUED | OUTPATIENT
Start: 2025-05-12 | End: 2025-05-12 | Stop reason: HOSPADM

## 2025-05-12 RX ORDER — LIDOCAINE HYDROCHLORIDE 20 MG/ML
INJECTION, SOLUTION EPIDURAL; INFILTRATION; INTRACAUDAL; PERINEURAL
Status: DISCONTINUED | OUTPATIENT
Start: 2025-05-12 | End: 2025-05-12

## 2025-05-12 RX ORDER — SODIUM CHLORIDE 0.9 % (FLUSH) 0.9 %
10 SYRINGE (ML) INJECTION
Status: DISCONTINUED | OUTPATIENT
Start: 2025-05-12 | End: 2025-05-12 | Stop reason: HOSPADM

## 2025-05-12 RX ORDER — ONDANSETRON HYDROCHLORIDE 2 MG/ML
INJECTION, SOLUTION INTRAVENOUS
Status: DISCONTINUED | OUTPATIENT
Start: 2025-05-12 | End: 2025-05-12

## 2025-05-12 RX ORDER — FENTANYL CITRATE 50 UG/ML
25 INJECTION, SOLUTION INTRAMUSCULAR; INTRAVENOUS EVERY 5 MIN PRN
Status: DISCONTINUED | OUTPATIENT
Start: 2025-05-12 | End: 2025-05-12 | Stop reason: HOSPADM

## 2025-05-12 RX ADMIN — DEXAMETHASONE SODIUM PHOSPHATE 4 MG: 4 INJECTION INTRA-ARTICULAR; INTRALESIONAL; INTRAMUSCULAR; INTRAVENOUS; SOFT TISSUE at 07:05

## 2025-05-12 RX ADMIN — SUCCINYLCHOLINE CHLORIDE 200 MG: 20 INJECTION, SOLUTION INTRAMUSCULAR; INTRAVENOUS; PARENTERAL at 07:05

## 2025-05-12 RX ADMIN — PROPOFOL 200 MG: 10 INJECTION, EMULSION INTRAVENOUS at 07:05

## 2025-05-12 RX ADMIN — CEFAZOLIN 3 G: 330 INJECTION, POWDER, FOR SOLUTION INTRAMUSCULAR; INTRAVENOUS at 07:05

## 2025-05-12 RX ADMIN — SODIUM CHLORIDE: 9 INJECTION, SOLUTION INTRAVENOUS at 06:05

## 2025-05-12 RX ADMIN — SUGAMMADEX 600 MG: 100 INJECTION, SOLUTION INTRAVENOUS at 09:05

## 2025-05-12 RX ADMIN — LIDOCAINE HYDROCHLORIDE 100 MG: 20 INJECTION, SOLUTION EPIDURAL; INFILTRATION; INTRACAUDAL at 07:05

## 2025-05-12 RX ADMIN — ONDANSETRON 4 MG: 2 INJECTION INTRAMUSCULAR; INTRAVENOUS at 09:05

## 2025-05-12 RX ADMIN — ENOXAPARIN SODIUM 40 MG: 40 INJECTION SUBCUTANEOUS at 06:05

## 2025-05-12 RX ADMIN — ROCURONIUM BROMIDE 45 MG: 10 INJECTION INTRAVENOUS at 07:05

## 2025-05-12 RX ADMIN — ROCURONIUM BROMIDE 5 MG: 10 INJECTION INTRAVENOUS at 07:05

## 2025-05-12 RX ADMIN — SODIUM CHLORIDE, SODIUM GLUCONATE, SODIUM ACETATE, POTASSIUM CHLORIDE AND MAGNESIUM CHLORIDE: 526; 502; 368; 37; 30 INJECTION, SOLUTION INTRAVENOUS at 08:05

## 2025-05-12 RX ADMIN — MIDAZOLAM 2 MG: 1 INJECTION INTRAMUSCULAR; INTRAVENOUS at 07:05

## 2025-05-12 RX ADMIN — Medication 50 MG: at 07:05

## 2025-05-12 RX ADMIN — FENTANYL CITRATE 100 MCG: 50 INJECTION INTRAMUSCULAR; INTRAVENOUS at 07:05

## 2025-05-12 RX ADMIN — ACETAMINOPHEN 1000 MG: 10 INJECTION, SOLUTION INTRAVENOUS at 11:05

## 2025-05-12 RX ADMIN — ROCURONIUM BROMIDE 10 MG: 10 INJECTION INTRAVENOUS at 08:05

## 2025-05-12 RX ADMIN — SODIUM CHLORIDE: 9 INJECTION, SOLUTION INTRAVENOUS at 07:05

## 2025-05-12 RX ADMIN — ROCURONIUM BROMIDE 50 MG: 10 INJECTION INTRAVENOUS at 07:05
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
## 2025-05-12 NOTE — PLAN OF CARE
Recovery care complete. Discharge instructions given to pt and family. Pt and family verbalized understanding of all instructions. Vital signs stable. Pt is in no acute distress at this time. Incision sites clean, dry, and intact. PIV removed. PO fluids given and tolerated well. Discharge home to self care with family.

## 2025-05-12 NOTE — PLAN OF CARE
Patient was prepared for surgery.    Patient needs Anesthesia consent.    Clarified that it was ok to start Lovenox. Med given as ordered.

## 2025-05-12 NOTE — ANESTHESIA PROCEDURE NOTES
Intubation    Date/Time: 5/12/2025 7:33 AM    Performed by: Saqib Barnes Jr., MD  Authorized by: Saqib Barnes Jr., MD    Intubation:     Induction:  Rapid sequence induction    Intubated:  Postinduction    Mask Ventilation:  N/a    Attempts:  1    Attempted By:  Staff anesthesiologist    Method of Intubation:  Video laryngoscopy    Blade:  Melgoza 3    Laryngeal View Grade: Grade I - full view of cords      Difficult Airway Encountered?: No      Complications:  None    Airway Device:  Oral endotracheal tube    Airway Device Size:  7.5    Style/Cuff Inflation:  Cuffed (inflated to minimal occlusive pressure)    Inflation Amount (mL):  5    Tube secured:  25    Secured at:  The lips    Placement Verified By:  Capnometry    Complicating Factors:  None    Findings Post-Intubation:  BS equal bilateral and atraumatic/condition of teeth unchanged

## 2025-05-12 NOTE — TRANSFER OF CARE
"Anesthesia Transfer of Care Note    Patient: Ovidio Montero Jr.    Procedure(s) Performed: Procedure(s) (LRB):  CHOLECYSTECTOMY, LAPAROSCOPIC (N/A)    Patient location: PACU    Transport from OR: Transported from OR on 6-10 L/min O2 by face mask with adequate spontaneous ventilation    Post pain: adequate analgesia    Post assessment: no apparent anesthetic complications and tolerated procedure well    Post vital signs: stable    Level of consciousness: awake and alert    Nausea/Vomiting: no nausea/vomiting    Complications: none    Transfer of care protocol was followed      Last vitals: Visit Vitals  /77   Pulse (!) 112   Temp 36.4 °C (97.5 °F) (Temporal)   Resp (!) 27   Ht 5' 11" (1.803 m)   Wt (!) 145.2 kg (320 lb)   SpO2 95%   BMI 44.63 kg/m²     "

## 2025-05-12 NOTE — PROGRESS NOTES
Shannan arrived to PACU with SpO2 mid-high 80's on 10L simple face mask, other VSS. Dr. Barnes and RT at bedside. Pt placed on CPAP per MD order. Pt tolerating well, will continue to monitor and wean CPAP as tolerated.

## 2025-05-12 NOTE — PROGRESS NOTES
Dr. Barnes at bedside evaluating patient's progress. OK to send to [facility] for discharge.

## 2025-05-12 NOTE — PROGRESS NOTES
Pt post-op EKG shows ST segment elevation. ST segment elevation increased compared to pre-op EKG. Dr. Barnes notified. Dr. Barnes came to bedside to assess pt. No orders given by Dr. Barnes. Will continue with education and discharge.

## 2025-05-12 NOTE — NURSING TRANSFER
Nursing Transfer Note      5/12/2025   11:47 AM    Nurse giving handoff: NATALIE Colvin  Nurse receiving handoff:NATALIE Bower    Transfer To: [facility]    Transfer via stretcher    Transfer with  to O2    Transported by RN    Order for Tele Monitor? No    Additional Lines: Oxygen    Patient belongings transferred with patient: No    Chart send with patient: Yes    Notified: mother    Patient reassessed at: 1140

## 2025-05-12 NOTE — OP NOTE
OPERATIVE REPORT    DATE OF PROCEDURE: 05/12/2025.     PREOPERATIVE DIAGNOSIS: Gall stones [K80.20]     POSTOPERATIVE DIAGNOSIS: Gall stones [K80.20]    PROCEDURE PERFORMED: Laparoscopic cholecystectomy.     ATTENDING SURGEON: Dr. Gustavo Araujo MD    RESIDENT: Daja Isaac MD (RES). Roger Butler MD (RES)    ANESTHESIA: General endotracheal.     ESTIMATED BLOOD LOSS: < 5 mL.     FINDINGS: Cholelithiasis and chronic inflammation.     SPECIMEN: Gallbladder.     DRAINS: None.     COMPLICATIONS: None.     INDICATIONS: Ovidio Montero Jr. is a 29 y.o.male referred to my General Surgery Clinic with a history of postprandial right upper quadrant abdominal pain. The history and exam were consistent with biliary colic, which was confirmed by laboratory studies and ultrasound. We recommended laparoscopic cholecystectomy and the patient agreed to proceed. The patient signed informed consent and expressed understanding of the risks and benefits of surgery.     OPERATIVE PROCEDURE: The patient was taken to the operating room and placed supine. After induction of general endotracheal tube anesthesia, the abdomen was prepped and draped in the standard fashion. Timeout was performed. A  supraumbilical skin incision was made. Subcutaneous tissue was bluntly dissected. The umbilical stalk was grasped with penetrating towel clip and elevated and a 1 cm midline infraumbilical fascial incision was made. The abdomen was bluntly entered under direct vision. A Kelly trocar was placed and the abdomen was insufflated with carbon dioxide to a maximum pressure of 15 mmHg. A 10-mm laparoscope was placed and the abdomen was examined. There was no evidence of injury from the initial trocar placement. Three 5-mm trocars were placed under direct vision through separate stab incisions, one subxiphoid and two in the right upper quadrant.     We directed our attention to the right upper quadrant. The gallbladder was identified and noted  to have significant inflammatory change. The fundus was grasped and retracted cranially and the infundibulum was grasped and retracted laterally. Omental adhesions were taken down bluntly. We bluntly dissected the peritoneal reflection off the infundibulum and neck of the gallbladder. With careful dissection in this area, we were able to identify the cystic duct. Further careful dissection identified the cystic artery and we did obtain a critical view of safety. Both duct and artery were triply clipped and divided. The gallbladder was dissected off the gallbladder fossa using Bovie electrocautery from infundibulum to fundus until free. It was placed into an EndoCatch bag and removed from the umbilical port site with no difficulty. We returned the laparoscope and Kelly trocar to the umbilicus and reexamined the right upper quadrant. The gallbladder fossa was examined and no further bleeding or any bile leak were noted.     The clips on the cystic duct and artery were examined and no bleeding or bile leak were noted. The right upper quadrant was irrigated with saline briefly until the returning effluent was clear. All ports were removed under direct vision and no bleeding from any port site was noted. The insufflation of the abdomen was evacuated and the laparoscope and Kelly trocar were removed. The fascial incision at the umbilical port site was closed with 0 Vicryl sutures in a figure of 8 fashion. All port sites were infiltrated with Marcaine and closed in a subcuticular fashion. Sterile dressings were applied. The patient was extubated in the Operating Room and transported to the Recovery Room in stable condition. All sponge, instrument and needle counts were correct at the end of the case. I was present and scrubbed for the entire procedure.

## 2025-05-12 NOTE — BRIEF OP NOTE
Kip Jacobo - Surgery ([facility])  Brief Operative Note    Surgery Date: 5/12/2025     Surgeons and Role:     * Gustavo Araujo MD - Primary     * Daja Isaac MD - Resident - Assisting     * Roger Butler MD - Resident - Assisting    Pre-op Diagnosis:  Gall stones [K80.20]    Post-op Diagnosis:  Post-Op Diagnosis Codes:     * Gall stones [K80.20]    Procedure(s) (LRB):  CHOLECYSTECTOMY, LAPAROSCOPIC (N/A)    Anesthesia: General    Operative Findings: evidence of chronic cholecystitis with multiple gallstones evident in the gallbladder  Critical view of safety obtained, artery and duct triply clipped. Irrigated until effluent clear    Estimated Blood Loss: <5 cc         Specimens:   Specimen (24h ago, onward)       Start     Ordered    05/12/25 0904  Specimen to Pathology General Surgery  RELEASE UPON ORDERING        Comments: Specimen 1: SPECIMEN COLLECTED AT 9:10 AM     References:    Click here for ordering Quick Tip   Question:  Release to patient  Answer:  Immediate    05/12/25 0904                    ID Type Source Tests Collected by Time Destination   1 : 1. GALLBLADDER - PERM Tissue Gallbladder SPECIMEN TO PATHOLOGY Daja Isaac MD 5/12/2025 0652            Discharge Note    OUTCOME: Patient tolerated treatment/procedure well without complication and is now ready for discharge.    DISPOSITION: Home or Self Care    FINAL DIAGNOSIS:  Gall stones    FOLLOWUP: In clinic    DISCHARGE INSTRUCTIONS:    Discharge Procedure Orders   Diet Adult Regular     Notify your health care provider if you experience any of the following:  temperature >100.4     Notify your health care provider if you experience any of the following:  persistent nausea and vomiting or diarrhea     Notify your health care provider if you experience any of the following:  severe uncontrolled pain     Notify your health care provider if you experience any of the following:  redness, tenderness, or signs of infection (pain, swelling, redness,  odor or green/yellow discharge around incision site)     Notify your health care provider if you experience any of the following:  difficulty breathing or increased cough     Weight bearing restrictions (specify):   Order Comments: Do not lift greater than 10 lbs for 4 weeks

## 2025-05-13 ENCOUNTER — PATIENT MESSAGE (OUTPATIENT)
Dept: SURGERY | Facility: CLINIC | Age: 30
End: 2025-05-13
Payer: COMMERCIAL

## 2025-05-13 ENCOUNTER — TELEPHONE (OUTPATIENT)
Dept: SURGERY | Facility: CLINIC | Age: 30
End: 2025-05-13
Payer: COMMERCIAL

## 2025-05-13 LAB
ESTROGEN SERPL-MCNC: NORMAL PG/ML
INSULIN SERPL-ACNC: NORMAL U[IU]/ML
LAB AP CLINICAL INFORMATION: NORMAL
LAB AP GROSS DESCRIPTION: NORMAL
LAB AP PERFORMING LOCATION(S): NORMAL
LAB AP REPORT FOOTNOTES: NORMAL

## 2025-05-13 NOTE — ANESTHESIA POSTPROCEDURE EVALUATION
Anesthesia Post Evaluation    Patient: Ovidio Montero .    Procedure(s) Performed: Procedure(s) (LRB):  CHOLECYSTECTOMY, LAPAROSCOPIC (N/A)    Patient brought to PACU on CPAP, unable to hold sats without it due to obesity and likely SYLVIA (patient states no known diagnosis but agrees he has felt he has SYLVIA for a while).  Once full awake from general anesthesia progressively de escalated from nasal cannula to room air.      Later called by [facility] nurse for concerns about ST elevation.  At that time patient resting comfortably in bed laughing with his mom.  Denies any pain whatsoever.  In particular no chest pain or shortness of breath.  Compared current EKG to prior and ST segments unchanged from baseline.  Discussed all of this with the [facility] nurse.  Patient OK to discharge    Final Anesthesia Type: general      Patient location during evaluation: PACU  Patient participation: Yes- Able to Participate  Level of consciousness: awake and alert and oriented  Post-procedure vital signs: reviewed and stable  Pain management: adequate  Airway patency: patent    PONV status at discharge: No PONV  Anesthetic complications: no      Cardiovascular status: blood pressure returned to baseline, hemodynamically stable and stable  Respiratory status: unassisted, room air and spontaneous ventilation  Hydration status: euvolemic  Follow-up not needed.              Vitals Value Taken Time   /78 05/12/25 12:30   Temp 37.2 °C (99 °F) 05/12/25 11:41   Pulse 99 05/12/25 12:30   Resp 20 05/12/25 12:30   SpO2 92 % 05/12/25 12:30         Event Time   Out of Recovery 10:45:00         Pain/Omer Score: Pain Rating Prior to Med Admin: 6 (5/12/2025 11:05 AM)  Pain Rating Post Med Admin: 4 (5/12/2025 11:35 AM)  Omer Score: 9 (5/12/2025 11:41 AM)

## 2025-05-13 NOTE — TELEPHONE ENCOUNTER
Left message on patient's voicemail and [facility] as a follow-up to his lap bev with Dr. Araujo yesterday 5/12/25.  Direct phone number given for him to call with any questions or concerns.

## 2025-05-27 ENCOUNTER — OFFICE VISIT (OUTPATIENT)
Dept: SURGERY | Facility: CLINIC | Age: 30
End: 2025-05-27
Payer: COMMERCIAL

## 2025-05-27 VITALS
HEART RATE: 113 BPM | BODY MASS INDEX: 43.39 KG/M2 | SYSTOLIC BLOOD PRESSURE: 163 MMHG | OXYGEN SATURATION: 97 % | WEIGHT: 309.94 LBS | HEIGHT: 71 IN | DIASTOLIC BLOOD PRESSURE: 91 MMHG

## 2025-05-27 DIAGNOSIS — Z48.89 POSTOPERATIVE VISIT: Primary | ICD-10-CM

## 2025-05-27 PROCEDURE — 3044F HG A1C LEVEL LT 7.0%: CPT | Mod: CPTII,S$GLB,, | Performed by: SURGERY

## 2025-05-27 PROCEDURE — 3077F SYST BP >= 140 MM HG: CPT | Mod: CPTII,S$GLB,, | Performed by: SURGERY

## 2025-05-27 PROCEDURE — 1159F MED LIST DOCD IN RCRD: CPT | Mod: CPTII,S$GLB,, | Performed by: SURGERY

## 2025-05-27 PROCEDURE — 99024 POSTOP FOLLOW-UP VISIT: CPT | Mod: S$GLB,,, | Performed by: SURGERY

## 2025-05-27 PROCEDURE — 3080F DIAST BP >= 90 MM HG: CPT | Mod: CPTII,S$GLB,, | Performed by: SURGERY

## 2025-05-27 PROCEDURE — 99999 PR PBB SHADOW E&M-EST. PATIENT-LVL III: CPT | Mod: PBBFAC,,, | Performed by: SURGERY

## 2025-05-27 PROCEDURE — 4010F ACE/ARB THERAPY RXD/TAKEN: CPT | Mod: CPTII,S$GLB,, | Performed by: SURGERY

## 2025-05-27 NOTE — PROGRESS NOTES
[facility]  General Surgery  Follow Up Clinic Note    Subjective:     Ovidio Montero Jr. is a 29 y.o. male who presents to clinic for follow up s/p laparoscopic cholecystectomy, DOS 5/12/2025. Pt reports that they are feeling well. Tolerating a regular diet and having regular bowel movements. Denies fever, chills, chest pain, and shortness of breath. Pain is well controlled. Denies redness or drainage of incision.    Medications:  Medications Ordered Prior to Encounter[1]      Objective:     PHYSICAL EXAM:  Vital Signs (Most Recent)  Pulse: (!) 113 (05/27/25 0929)  BP: (!) 163/91 (05/27/25 0929)  SpO2: 97 % (05/27/25 0929)    Physical Exam:  Gen: no apparent distress, awake and alert  CV: regular rate/rhythm  Pulm: non-labored breathing, equal and bilateral chest rise  Abd: soft, non-distended, non-tender to palpation  Ext: warm and well perfused, no edema  Skin: warm and dry, no lesions appreciated  Incision: c/d/I, no surrounding erythema or edema  Neuro: motor and sensation grossly intact and symmetric     Pathology 5/12/2025:  - Chronic cholecystitis and cholesterolosis  - Cholelithiasis      Assessment:     Ovidio Montero Jr. is a 29 y.o. male presenting to clinic today for follow up s/p laparoscopic cholecystectomy, DOS 5/12/2025. Postoperative course is progressing appropriately    Plan:     - Patient is recovering well  - Continue with lifting restrictions for 4 more weeks  - RTC PRN      Mannie Harris, DPM  General Surgery PGY-1       [1]   Current Outpatient Medications on File Prior to Visit   Medication Sig Dispense Refill    amlodipine-olmesartan (HEMA) 10-40 mg per tablet Take 1 tablet by mouth once daily. 90 tablet 3    atorvastatin (LIPITOR) 20 MG tablet Take 1 tablet (20 mg total) by mouth once daily. 90 tablet 6    buPROPion (WELLBUTRIN SR) 100 MG TBSR 12 hr tablet Take 1 tablet (100 mg total) by mouth every morning. 30 tablet 0    ibuprofen (ADVIL,MOTRIN)  800 MG tablet Take 1 tablet (800 mg total) by mouth every 6 (six) hours as needed for Pain. 20 tablet 0    melatonin (MELATIN) 5 mg Take by mouth.      metFORMIN (GLUCOPHAGE) 500 MG tablet Take 1 tablet (500 mg total) by mouth 2 (two) times daily. 60 tablet 5    ondansetron (ZOFRAN-ODT) 4 MG TbDL Take 1 tablet (4 mg total) by mouth every 6 (six) hours as needed (nausea/vomiting). 30 tablet 0    oxyCODONE (ROXICODONE) 5 MG immediate release tablet Take 1 tablet (5 mg total) by mouth every 6 (six) hours as needed for Pain. (Patient not taking: Reported on 5/27/2025) 12 tablet 0     No current facility-administered medications on file prior to visit.

## 2025-06-04 ENCOUNTER — PATIENT MESSAGE (OUTPATIENT)
Dept: SURGERY | Facility: CLINIC | Age: 30
End: 2025-06-04
Payer: COMMERCIAL

## 2025-06-09 ENCOUNTER — OFFICE VISIT (OUTPATIENT)
Dept: INTERNAL MEDICINE | Facility: CLINIC | Age: 30
End: 2025-06-09
Payer: COMMERCIAL

## 2025-06-09 VITALS
DIASTOLIC BLOOD PRESSURE: 84 MMHG | BODY MASS INDEX: 44.06 KG/M2 | SYSTOLIC BLOOD PRESSURE: 136 MMHG | OXYGEN SATURATION: 97 % | HEART RATE: 90 BPM | WEIGHT: 315 LBS

## 2025-06-09 DIAGNOSIS — E11.69 HYPERLIPIDEMIA ASSOCIATED WITH TYPE 2 DIABETES MELLITUS: ICD-10-CM

## 2025-06-09 DIAGNOSIS — E11.9 TYPE 2 DIABETES MELLITUS WITHOUT COMPLICATION, WITHOUT LONG-TERM CURRENT USE OF INSULIN: Primary | ICD-10-CM

## 2025-06-09 DIAGNOSIS — E11.59 HYPERTENSION ASSOCIATED WITH TYPE 2 DIABETES MELLITUS: ICD-10-CM

## 2025-06-09 DIAGNOSIS — I15.2 HYPERTENSION ASSOCIATED WITH TYPE 2 DIABETES MELLITUS: ICD-10-CM

## 2025-06-09 DIAGNOSIS — E78.5 HYPERLIPIDEMIA ASSOCIATED WITH TYPE 2 DIABETES MELLITUS: ICD-10-CM

## 2025-06-09 PROCEDURE — 99214 OFFICE O/P EST MOD 30 MIN: CPT | Mod: S$GLB,,, | Performed by: INTERNAL MEDICINE

## 2025-06-09 PROCEDURE — 3079F DIAST BP 80-89 MM HG: CPT | Mod: CPTII,S$GLB,, | Performed by: INTERNAL MEDICINE

## 2025-06-09 PROCEDURE — 3075F SYST BP GE 130 - 139MM HG: CPT | Mod: CPTII,S$GLB,, | Performed by: INTERNAL MEDICINE

## 2025-06-09 PROCEDURE — 99999 PR PBB SHADOW E&M-EST. PATIENT-LVL IV: CPT | Mod: PBBFAC,,, | Performed by: INTERNAL MEDICINE

## 2025-06-09 PROCEDURE — 3044F HG A1C LEVEL LT 7.0%: CPT | Mod: CPTII,S$GLB,, | Performed by: INTERNAL MEDICINE

## 2025-06-09 PROCEDURE — 3008F BODY MASS INDEX DOCD: CPT | Mod: CPTII,S$GLB,, | Performed by: INTERNAL MEDICINE

## 2025-06-09 PROCEDURE — 4010F ACE/ARB THERAPY RXD/TAKEN: CPT | Mod: CPTII,S$GLB,, | Performed by: INTERNAL MEDICINE

## 2025-06-09 PROCEDURE — 1160F RVW MEDS BY RX/DR IN RCRD: CPT | Mod: CPTII,S$GLB,, | Performed by: INTERNAL MEDICINE

## 2025-06-09 PROCEDURE — 1159F MED LIST DOCD IN RCRD: CPT | Mod: CPTII,S$GLB,, | Performed by: INTERNAL MEDICINE

## 2025-06-09 RX ORDER — TIRZEPATIDE 2.5 MG/.5ML
2.5 INJECTION, SOLUTION SUBCUTANEOUS
Qty: 6 ML | Refills: 0 | Status: SHIPPED | OUTPATIENT
Start: 2025-06-09 | End: 2025-09-07

## 2025-06-09 RX ORDER — TIRZEPATIDE 2.5 MG/.5ML
2.5 INJECTION, SOLUTION SUBCUTANEOUS
Qty: 6 ML | Refills: 0 | Status: SHIPPED | OUTPATIENT
Start: 2025-06-09 | End: 2025-06-09

## 2025-06-09 NOTE — PROGRESS NOTES
"    CHIEF COMPLAINT     Chief Complaint   Patient presents with    Follow-up       HPI     Ovidioshawna Montero . is a 29 y.o. male type 2 diabetes hypertension hyperlipidemia here today for     S/p bev. Nausea resolved, diarrhea resolved. Appetite has normalizad.  Personally Reviewed Patient's Medical, surgical, family and social hx. Changes updated in [facility].  Care Team updated in Epic    Review of Systems:  Review of Systems    Health Maintenance:   Reviewed with patient  Due for the following:      PHYSICAL EXAM     /84   Pulse 90   Wt (!) 143.3 kg (315 lb 14.7 oz)   SpO2 97%   BMI 44.06 kg/m²     Gen: Well Appearing, NAD  HEENT: PERR, EOMI  Chest normal work of breathing:   Neuro: A&Ox3, gait normal, speech normal  Mood; Mood normal, behavior normal, thought process linear       LABS     Labs reviewed; Notable for  Lab Results   Component Value Date    HGBA1C 6.2 (H) 04/28/2025       Chemistry        Component Value Date/Time     04/28/2025 1440     10/10/2024 2006    K 4.7 04/28/2025 1440    K 3.8 10/10/2024 2006     04/28/2025 1440     10/10/2024 2006    CO2 26 04/28/2025 1440    CO2 24 10/10/2024 2006    BUN 18 04/28/2025 1440    CREATININE 0.9 04/28/2025 1440     (H) 04/28/2025 1440    GLU 94 10/10/2024 2006        Component Value Date/Time    CALCIUM 10.5 04/28/2025 1440    CALCIUM 10.1 10/10/2024 2006    ALKPHOS 67 04/28/2025 1440    ALKPHOS 60 10/10/2024 2006    AST 21 04/28/2025 1440    AST 44 (H) 10/10/2024 2006    ALT 33 04/28/2025 1440    ALT 50 (H) 10/10/2024 2006    BILITOT 0.2 04/28/2025 1440    BILITOT 0.2 10/10/2024 2006    ESTGFRAFRICA SEE COMMENT 01/30/2015 1046    EGFRNONAA SEE COMMENT 01/30/2015 1046        No results found for: "LDL"  LDL 86  ASSESSMENT     1. Type 2 diabetes mellitus without complication, without long-term current use of insulin  tirzepatide (MOUNJARO) 2.5 mg/0.5 mL PnIj      2. Hypertension associated with type 2 diabetes " mellitus        3. Hyperlipidemia associated with type 2 diabetes mellitus                Plan     Ovidio Montero Jr. is a 29 y.o. male with type 2 diabetes hypertension hyperlipidemia anxiety  1. Type 2 diabetes mellitus without complication, without long-term current use of insulin  Previously on Ozempic stopped secondary to nausea  Continue metformin 500 mg daily  Start Mounjaro 2.5 mg weekly  Recheck A1c in 6 months    - tirzepatide (MOUNJARO) 2.5 mg/0.5 mL PnIj; Inject 2.5 mg into the skin every 7 days.  Dispense: 6 mL; Refill: 0    2. Hypertension associated with type 2 diabetes mellitus  At goal continue Alfie 10-40    3. Hyperlipidemia associated with type 2 diabetes mellitus  LDL at goal continue atorvastatin 20    Rufino Maurer MD

## 2025-06-21 ENCOUNTER — HOSPITAL ENCOUNTER (EMERGENCY)
Facility: HOSPITAL | Age: 30
Discharge: HOME | End: 2025-06-21
Attending: STUDENT IN AN ORGANIZED HEALTH CARE EDUCATION/TRAINING PROGRAM
Payer: COMMERCIAL

## 2025-06-21 VITALS
TEMPERATURE: 98 F | SYSTOLIC BLOOD PRESSURE: 144 MMHG | DIASTOLIC BLOOD PRESSURE: 83 MMHG | WEIGHT: 315 LBS | OXYGEN SATURATION: 100 % | HEIGHT: 71 IN | BODY MASS INDEX: 44.1 KG/M2 | HEART RATE: 75 BPM | RESPIRATION RATE: 14 BRPM

## 2025-06-21 DIAGNOSIS — R07.9 CHEST PAIN: ICD-10-CM

## 2025-06-21 DIAGNOSIS — K21.9 GASTROESOPHAGEAL REFLUX DISEASE, UNSPECIFIED WHETHER ESOPHAGITIS PRESENT: Primary | ICD-10-CM

## 2025-06-21 LAB
ABSOLUTE EOSINOPHIL (OHS): 0.2 K/UL
ABSOLUTE MONOCYTE (OHS): 0.67 K/UL (ref 0.3–1)
ABSOLUTE NEUTROPHIL COUNT (OHS): 4.04 K/UL (ref 1.8–7.7)
ALBUMIN SERPL BCP-MCNC: 4.5 G/DL (ref 3.5–5.2)
ALP SERPL-CCNC: 60 UNIT/L (ref 40–150)
ALT SERPL W/O P-5'-P-CCNC: 42 UNIT/L (ref 10–44)
ANION GAP (OHS): 10 MMOL/L (ref 8–16)
AST SERPL-CCNC: 28 UNIT/L (ref 11–45)
BASOPHILS # BLD AUTO: 0.05 K/UL
BASOPHILS NFR BLD AUTO: 0.6 %
BILIRUB SERPL-MCNC: 0.4 MG/DL (ref 0.1–1)
BNP SERPL-MCNC: <10 PG/ML (ref 0–99)
BUN SERPL-MCNC: 16 MG/DL (ref 6–20)
CALCIUM SERPL-MCNC: 9.7 MG/DL (ref 8.7–10.5)
CHLORIDE SERPL-SCNC: 108 MMOL/L (ref 95–110)
CO2 SERPL-SCNC: 24 MMOL/L (ref 23–29)
CREAT SERPL-MCNC: 0.8 MG/DL (ref 0.5–1.4)
ERYTHROCYTE [DISTWIDTH] IN BLOOD BY AUTOMATED COUNT: 14.1 % (ref 11.5–14.5)
GFR SERPLBLD CREATININE-BSD FMLA CKD-EPI: >60 ML/MIN/1.73/M2
GLUCOSE SERPL-MCNC: 79 MG/DL (ref 70–110)
HCT VFR BLD AUTO: 41.6 % (ref 40–54)
HGB BLD-MCNC: 13.4 GM/DL (ref 14–18)
IMM GRANULOCYTES # BLD AUTO: 0.02 K/UL (ref 0–0.04)
IMM GRANULOCYTES NFR BLD AUTO: 0.3 % (ref 0–0.5)
LYMPHOCYTES # BLD AUTO: 2.73 K/UL (ref 1–4.8)
MCH RBC QN AUTO: 27.8 PG (ref 27–31)
MCHC RBC AUTO-ENTMCNC: 32.2 G/DL (ref 32–36)
MCV RBC AUTO: 86 FL (ref 82–98)
NUCLEATED RBC (/100WBC) (OHS): 0 /100 WBC
OHS QRS DURATION: 106 MS
OHS QTC CALCULATION: 427 MS
PLATELET # BLD AUTO: 295 K/UL (ref 150–450)
PMV BLD AUTO: 10.3 FL (ref 9.2–12.9)
POTASSIUM SERPL-SCNC: 4 MMOL/L (ref 3.5–5.1)
PROT SERPL-MCNC: 7.6 GM/DL (ref 6–8.4)
RBC # BLD AUTO: 4.82 M/UL (ref 4.6–6.2)
RELATIVE EOSINOPHIL (OHS): 2.6 %
RELATIVE LYMPHOCYTE (OHS): 35.4 % (ref 18–48)
RELATIVE MONOCYTE (OHS): 8.7 % (ref 4–15)
RELATIVE NEUTROPHIL (OHS): 52.4 % (ref 38–73)
SODIUM SERPL-SCNC: 142 MMOL/L (ref 136–145)
TROPONIN I SERPL HS-MCNC: <3 NG/L
WBC # BLD AUTO: 7.71 K/UL (ref 3.9–12.7)

## 2025-06-21 PROCEDURE — 93005 ELECTROCARDIOGRAM TRACING: CPT

## 2025-06-21 PROCEDURE — 80053 COMPREHEN METABOLIC PANEL: CPT

## 2025-06-21 PROCEDURE — 84484 ASSAY OF TROPONIN QUANT: CPT

## 2025-06-21 PROCEDURE — 93010 ELECTROCARDIOGRAM REPORT: CPT | Mod: ,,, | Performed by: INTERNAL MEDICINE

## 2025-06-21 PROCEDURE — 25000003 PHARM REV CODE 250

## 2025-06-21 PROCEDURE — 85025 COMPLETE CBC W/AUTO DIFF WBC: CPT

## 2025-06-21 PROCEDURE — 83880 ASSAY OF NATRIURETIC PEPTIDE: CPT

## 2025-06-21 PROCEDURE — 99284 EMERGENCY DEPT VISIT MOD MDM: CPT | Mod: 25

## 2025-06-21 RX ORDER — ALUMINUM HYDROXIDE, MAGNESIUM HYDROXIDE, AND SIMETHICONE 1200; 120; 1200 MG/30ML; MG/30ML; MG/30ML
30 SUSPENSION ORAL ONCE
Status: COMPLETED | OUTPATIENT
Start: 2025-06-21 | End: 2025-06-21

## 2025-06-21 RX ORDER — LIDOCAINE HYDROCHLORIDE 20 MG/ML
15 SOLUTION OROPHARYNGEAL ONCE
Status: COMPLETED | OUTPATIENT
Start: 2025-06-21 | End: 2025-06-21

## 2025-06-21 RX ADMIN — LIDOCAINE HYDROCHLORIDE 15 ML: 20 SOLUTION ORAL at 03:06

## 2025-06-21 RX ADMIN — ALUMINUM HYDROXIDE, MAGNESIUM HYDROXIDE, AND SIMETHICONE 30 ML: 200; 200; 20 SUSPENSION ORAL at 03:06

## 2025-06-21 NOTE — ED PROVIDER NOTES
Encounter Date: 6/21/2025       History     Chief Complaint   Patient presents with    Chest Pain     HPI    29-year-old Male presents to the emergency department after a midsternal chest pain that began at.  Patient has a history of hyper tachycardia and high blood pressure.  Denies pain that radiates to the back shoulder or arm.  No nausea vomiting no diarrhea.  Patient is otherwise feeling well.  Patient is not short of breath.     Denies GALVEZ, LOC, SOB, Falls, Trauma, hemoptysis, hematemis, melena, fever, chills, nausea, vomiting, diarrhea.         Review of patient's allergies indicates:   Allergen Reactions    Penicillins      Past Medical History:   Diagnosis Date    Acquired acanthosis nigricans     ADHD (attention deficit hyperactivity disorder)     Dr. Champagne    Anxiety     Diabetes mellitus     HTN (hypertension)     Hyperlipidemia, mixed     Hypertension     Insulin resistance     Morbid obesity     Seasonal allergic rhinitis     Sleep disturbance     Dr. Champagne, counseling    Tic disorder     Tic disorder 05/03/2012    Vision abnormalities     Myopia & Astigmatism     Past Surgical History:   Procedure Laterality Date    CIRCUMCISION, PRIMARY      LAPAROSCOPIC CHOLECYSTECTOMY N/A 5/12/2025    Procedure: CHOLECYSTECTOMY, LAPAROSCOPIC;  Surgeon: Gustavo Araujo MD;  Location: [facility] OR [address] [place] [address];  Service: General;  Laterality: N/A;     Family History   Problem Relation Name Age of Onset    Obesity Mother Ena     Anxiety disorder Mother Ena     Depression Father      Mental illness Father      Depression Paternal Aunt x1     Mental illness Paternal Aunt x1     Depression Maternal Grandmother      Bipolar disorder Maternal Grandmother      Leukemia Maternal Grandfather          Adult Leukemia - in Remission    Depression Paternal Grandfather      Suicidality Paternal Grandfather      Early death Paternal Grandfather      Cancer Other Mat DZ     Depression Other Mat DZ     Bipolar disorder Other Mat DZ      Mental illness Other Mat DZ     Stroke Other [facility] DZ     Hypertension Other [facility] DZ     Depression Other Pat DZ     Bipolar disorder Other [facility] DZ     Mental illness Other [facility] DZ      Social History[1]  Review of Systems    Physical Exam     Initial Vitals [06/21/25 0223]   BP Pulse Resp Temp SpO2   (!) 140/87 106 16 98.1 °F (36.7 °C) 97 %      MAP       --         Physical Exam    Nursing note and vitals reviewed.  Constitutional: He appears well-developed and well-nourished.   HENT:   Head: Normocephalic and atraumatic.   Eyes: EOM are normal. Right eye exhibits no discharge. Left eye exhibits no discharge. No scleral icterus.   Neck: Neck supple. No tracheal deviation present. No JVD present.   Normal range of motion.  Cardiovascular:  Normal heart sounds and intact distal pulses.     Exam reveals no gallop and no friction rub.       No murmur heard.  Pulmonary/Chest: Breath sounds normal. No stridor. No respiratory distress. He has no wheezes. He has no rales. He exhibits no tenderness.   Abdominal: Abdomen is soft. He exhibits no distension. There is no abdominal tenderness. There is no rebound.   Musculoskeletal:         General: No tenderness or edema.      Cervical back: Normal range of motion and neck supple.     Neurological: He is alert and oriented to person, place, and time. GCS score is 15. GCS eye subscore is 4. GCS verbal subscore is 5. GCS motor subscore is 6.   Skin: Skin is warm. Capillary refill takes less than 2 seconds.   Psychiatric: He has a normal mood and affect. His behavior is normal. Judgment and thought content normal.         ED Course   Procedures  Labs Reviewed   CBC WITH DIFFERENTIAL - Abnormal       Result Value    WBC 7.71      RBC 4.82      HGB 13.4 (*)     HCT 41.6      MCV 86      MCH 27.8      MCHC 32.2      RDW 14.1      Platelet Count 295      MPV 10.3      Nucleated RBC 0      Neut % 52.4      Lymph % 35.4      Mono % 8.7      Eos % 2.6      Basophil % 0.6      Imm Grans %  0.3      Neut # 4.04      Lymph # 2.73      Mono # 0.67      Eos # 0.20      Baso # 0.05      Imm Grans # 0.02     COMPREHENSIVE METABOLIC PANEL - Normal    Sodium 142      Potassium 4.0      Chloride 108      CO2 24      Glucose 79      BUN 16      Creatinine 0.8      Calcium 9.7      Protein Total 7.6      Albumin 4.5      Bilirubin Total 0.4      ALP 60      AST 28      ALT 42      Anion Gap 10      eGFR >60     TROPONIN I HIGH SENSITIVITY - Normal    Troponin High Sensitive <3     B-TYPE NATRIURETIC PEPTIDE - Normal    BNP <10     CBC W/ AUTO DIFFERENTIAL    Narrative:     The following orders were created for panel order CBC auto differential.  Procedure                               Abnormality         Status                     ---------                               -----------         ------                     CBC with Differential[[id number]]       Abnormal            Final result                 Please view results for these tests on the individual orders.        ECG Results              EKG 12-lead (Final result)        Collection Time Result Time QRS Duration [facility] QTC Calculation    06/21/25 02:28:22 06/21/25 08:29:20 106 427                     Final result by Interface, Lab In [facility] (06/21/25 08:29:29)                   Narrative:    Test Reason : R07.9,    Vent. Rate : 102 BPM     Atrial Rate : 102 BPM     P-R Int : 152 ms          QRS Dur : 106 ms      QT Int : 328 ms       P-R-T Axes :  37  61  29 degrees    QTcB Int : 427 ms    Sinus tachycardia  Nonspecific T wave abnormality  Abnormal ECG  When compared with ECG of 14-Apr-2025 06:04,  No significant change was found  Confirmed by Azael Husain ([id number]) on 6/21/2025 8:29:18 AM    Referred By: AAAREFERRAL SELF           Confirmed By: Azael Husain                                  Imaging Results    None          Medications   aluminum-magnesium hydroxide-simethicone 200-200-20 mg/5 mL suspension 30 mL (30 mLs Oral Given 6/21/25 0346)     And   LIDOcaine  viscous HCl 2% oral solution 15 mL (15 mLs Oral Given 6/21/25 0346)     Medical Decision Making  Amount and/or Complexity of Data Reviewed  Labs: ordered.  ECG/medicine tests:  Decision-making details documented in ED Course.    Risk  OTC drugs.  Prescription drug management.    29M presents for chest pain that has resolved.     Differential diagnosis for this patient includes but isn't limited to deconditioning, ACS, GERD, costochondritis, pulmonary embolism, given the patients pain started more than 3 hours ago, plan to single troponin prior to discharge.  GI cocktail for possible GERD.  Pulmonary embolism less likely, no risk factors.  Patient not short of breath.    Given the patient had greater than 3 hours since start of his symptoms, 2nd troponin not ordered.  Patient experienced full relief with GI cocktail.  Discharging patient to primary care doctor.    After visit summary printed and given to patient, patient verbalizes understanding of return precautions.  Vital signs stable at time of discharge, questions answered.           Attending Attestation:   Physician Attestation Statement for Resident:  As the supervising MD   Physician Attestation Statement: I have personally seen and examined this patient.   I agree with the above history.  -:   As the supervising MD I agree with the above PE.     As the supervising MD I agree with the above treatment, course, plan, and disposition.                    ED Course as of 06/21/25 2323   Sat Jun 21, 2025   0343 EKG 12-lead  Independently interpreted shows sinus tachycardia, rate 102, no STEMI [BD]   0349 29-year-old Male presents to the emergency department after a midsternal chest pain that began at.  Patient has a history of hyper tachycardia and high blood pressure.  Denies pain that radiates to the back shoulder or arm.  No nausea vomiting no diarrhea.  Patient is otherwise feeling well.  Patient is not short of breath. [KW]   0350 Differential diagnosis for  this patient includes but isn't limited to deconditioning, ACS, GERD, costochondritis, pulmonary embolism, given the patients pain started more than 3 hours ago, plan to single troponin prior to discharge.  GI cocktail for possible GERD.  Pulmonary embolism less likely, no risk factors.  Patient not short of breath. [KW]   0648 Given the patient had greater than 3 hours since start of his symptoms, 2nd troponin not ordered.  Patient experienced significant relief with GI cocktail.  Discharging patient to primary care doctor.    After visit summary printed and given to patient, patient verbalizes understanding of return precautions.  Vital signs stable at time of discharge, questions answered.    [KW]      ED Course User Index  [BD] Agusto Santacruz MD  [KW] Richy Hwang MD                           Clinical Impression:  Final diagnoses:  [R07.9] Chest pain  [K21.9] Gastroesophageal reflux disease, unspecified whether esophagitis present (Primary)          ED Disposition Condition    Discharge Stable          ED Prescriptions    None       Follow-up Information       Follow up With Specialties Details Why Contact Info    Rufino Maurer MD Internal Medicine   [address] [place] [id number]  [phone]                   Richy Hwang MD  Resident  06/21/25 3533       Richy Hwang MD  Resident  06/21/25 5847         [1]   Social History  Tobacco Use    Smoking status: Never     Passive exposure: Yes    Smokeless tobacco: Never   Substance Use Topics    Alcohol use: Never    Drug use: Never        Richy Hwang MD  Resident  06/22/25 9880

## 2025-07-10 ENCOUNTER — OUTPATIENT CASE MANAGEMENT (OUTPATIENT)
Dept: ADMINISTRATIVE | Facility: OTHER | Age: 30
End: 2025-07-10
Payer: COMMERCIAL

## 2025-07-18 ENCOUNTER — HOSPITAL ENCOUNTER (EMERGENCY)
Facility: HOSPITAL | Age: 30
Discharge: HOME | End: 2025-07-18
Attending: EMERGENCY MEDICINE
Payer: COMMERCIAL

## 2025-07-18 ENCOUNTER — OUTPATIENT CASE MANAGEMENT (OUTPATIENT)
Dept: ADMINISTRATIVE | Facility: OTHER | Age: 30
End: 2025-07-18
Payer: COMMERCIAL

## 2025-07-18 ENCOUNTER — PATIENT MESSAGE (OUTPATIENT)
Dept: ADMINISTRATIVE | Facility: OTHER | Age: 30
End: 2025-07-18
Payer: COMMERCIAL

## 2025-07-18 ENCOUNTER — TELEPHONE (OUTPATIENT)
Dept: INTERNAL MEDICINE | Facility: CLINIC | Age: 30
End: 2025-07-18
Payer: COMMERCIAL

## 2025-07-18 VITALS
WEIGHT: 315 LBS | BODY MASS INDEX: 44.1 KG/M2 | RESPIRATION RATE: 18 BRPM | HEART RATE: 93 BPM | DIASTOLIC BLOOD PRESSURE: 80 MMHG | TEMPERATURE: 99 F | HEIGHT: 71 IN | OXYGEN SATURATION: 96 % | SYSTOLIC BLOOD PRESSURE: 140 MMHG

## 2025-07-18 DIAGNOSIS — F32.A DEPRESSION, UNSPECIFIED DEPRESSION TYPE: Primary | ICD-10-CM

## 2025-07-18 DIAGNOSIS — R73.09 ELEVATED GLUCOSE: ICD-10-CM

## 2025-07-18 DIAGNOSIS — I10 HYPERTENSION, UNSPECIFIED TYPE: ICD-10-CM

## 2025-07-18 LAB
ABSOLUTE EOSINOPHIL (OHS): 0.2 K/UL
ABSOLUTE MONOCYTE (OHS): 0.48 K/UL (ref 0.3–1)
ABSOLUTE NEUTROPHIL COUNT (OHS): 5.19 K/UL (ref 1.8–7.7)
ALBUMIN SERPL BCP-MCNC: 4.3 G/DL (ref 3.5–5.2)
ALP SERPL-CCNC: 66 UNIT/L (ref 40–150)
ALT SERPL W/O P-5'-P-CCNC: 28 UNIT/L (ref 10–44)
AMPHET UR QL SCN: NEGATIVE
ANION GAP (OHS): 12 MMOL/L (ref 8–16)
APAP SERPL-MCNC: <3 UG/ML (ref 10–20)
AST SERPL-CCNC: 19 UNIT/L (ref 11–45)
BACTERIA #/AREA URNS AUTO: NORMAL /HPF
BARBITURATE SCN PRESENT UR: NEGATIVE
BASOPHILS # BLD AUTO: 0.05 K/UL
BASOPHILS NFR BLD AUTO: 0.6 %
BENZODIAZ UR QL SCN: NEGATIVE
BILIRUB SERPL-MCNC: 0.3 MG/DL (ref 0.1–1)
BILIRUB UR QL STRIP.AUTO: NEGATIVE
BUN SERPL-MCNC: 12 MG/DL (ref 6–20)
CALCIUM SERPL-MCNC: 9.1 MG/DL (ref 8.7–10.5)
CANNABINOIDS UR QL SCN: NEGATIVE
CHLORIDE SERPL-SCNC: 107 MMOL/L (ref 95–110)
CLARITY UR: CLEAR
CO2 SERPL-SCNC: 22 MMOL/L (ref 23–29)
COCAINE UR QL SCN: NEGATIVE
COLOR UR AUTO: YELLOW
CREAT SERPL-MCNC: 0.8 MG/DL (ref 0.5–1.4)
CREAT UR-MCNC: 203 MG/DL (ref 23–375)
ERYTHROCYTE [DISTWIDTH] IN BLOOD BY AUTOMATED COUNT: 13.6 % (ref 11.5–14.5)
ETHANOL SERPL-MCNC: <10 MG/DL
GFR SERPLBLD CREATININE-BSD FMLA CKD-EPI: >60 ML/MIN/1.73/M2
GLUCOSE SERPL-MCNC: 129 MG/DL (ref 70–110)
GLUCOSE UR QL STRIP: NEGATIVE
HCT VFR BLD AUTO: 40.3 % (ref 40–54)
HGB BLD-MCNC: 13.3 GM/DL (ref 14–18)
HGB UR QL STRIP: NEGATIVE
HYALINE CASTS UR QL AUTO: 0 /LPF (ref 0–1)
IMM GRANULOCYTES # BLD AUTO: 0.05 K/UL (ref 0–0.04)
IMM GRANULOCYTES NFR BLD AUTO: 0.6 % (ref 0–0.5)
KETONES UR QL STRIP: NEGATIVE
LEUKOCYTE ESTERASE UR QL STRIP: NEGATIVE
LYMPHOCYTES # BLD AUTO: 2.19 K/UL (ref 1–4.8)
MCH RBC QN AUTO: 28.5 PG (ref 27–31)
MCHC RBC AUTO-ENTMCNC: 33 G/DL (ref 32–36)
MCV RBC AUTO: 87 FL (ref 82–98)
METHADONE UR QL SCN: NEGATIVE
MICROSCOPIC COMMENT: NORMAL
NITRITE UR QL STRIP: NEGATIVE
NUCLEATED RBC (/100WBC) (OHS): 0 /100 WBC
OPIATES UR QL SCN: NEGATIVE
PCP UR QL: NEGATIVE
PH UR STRIP: 7 [PH]
PLATELET # BLD AUTO: 291 K/UL (ref 150–450)
PMV BLD AUTO: 10.3 FL (ref 9.2–12.9)
POTASSIUM SERPL-SCNC: 3.5 MMOL/L (ref 3.5–5.1)
PROT SERPL-MCNC: 7.6 GM/DL (ref 6–8.4)
PROT UR QL STRIP: ABNORMAL
RBC # BLD AUTO: 4.66 M/UL (ref 4.6–6.2)
RBC #/AREA URNS AUTO: 1 /HPF (ref 0–4)
RELATIVE EOSINOPHIL (OHS): 2.5 %
RELATIVE LYMPHOCYTE (OHS): 26.8 % (ref 18–48)
RELATIVE MONOCYTE (OHS): 5.9 % (ref 4–15)
RELATIVE NEUTROPHIL (OHS): 63.6 % (ref 38–73)
SALICYLATES SERPL-MCNC: <5 MG/DL (ref 15–30)
SODIUM SERPL-SCNC: 141 MMOL/L (ref 136–145)
SP GR UR STRIP: 1.03
SQUAMOUS #/AREA URNS AUTO: <1 /HPF
TSH SERPL-ACNC: 1.58 UIU/ML (ref 0.4–4)
UROBILINOGEN UR STRIP-ACNC: NEGATIVE EU/DL
WBC # BLD AUTO: 8.16 K/UL (ref 3.9–12.7)
WBC #/AREA URNS AUTO: <1 /HPF (ref 0–5)
YEAST UR QL AUTO: NORMAL /HPF

## 2025-07-18 PROCEDURE — 82077 ASSAY SPEC XCP UR&BREATH IA: CPT | Performed by: STUDENT IN AN ORGANIZED HEALTH CARE EDUCATION/TRAINING PROGRAM

## 2025-07-18 PROCEDURE — 80143 DRUG ASSAY ACETAMINOPHEN: CPT | Performed by: STUDENT IN AN ORGANIZED HEALTH CARE EDUCATION/TRAINING PROGRAM

## 2025-07-18 PROCEDURE — 80053 COMPREHEN METABOLIC PANEL: CPT | Performed by: STUDENT IN AN ORGANIZED HEALTH CARE EDUCATION/TRAINING PROGRAM

## 2025-07-18 PROCEDURE — 80307 DRUG TEST PRSMV CHEM ANLYZR: CPT | Performed by: STUDENT IN AN ORGANIZED HEALTH CARE EDUCATION/TRAINING PROGRAM

## 2025-07-18 PROCEDURE — 84443 ASSAY THYROID STIM HORMONE: CPT | Performed by: EMERGENCY MEDICINE

## 2025-07-18 PROCEDURE — 80179 DRUG ASSAY SALICYLATE: CPT | Performed by: STUDENT IN AN ORGANIZED HEALTH CARE EDUCATION/TRAINING PROGRAM

## 2025-07-18 PROCEDURE — 85025 COMPLETE CBC W/AUTO DIFF WBC: CPT | Performed by: STUDENT IN AN ORGANIZED HEALTH CARE EDUCATION/TRAINING PROGRAM

## 2025-07-18 PROCEDURE — 81001 URINALYSIS AUTO W/SCOPE: CPT | Performed by: STUDENT IN AN ORGANIZED HEALTH CARE EDUCATION/TRAINING PROGRAM

## 2025-07-18 PROCEDURE — 99283 EMERGENCY DEPT VISIT LOW MDM: CPT

## 2025-07-18 NOTE — LETTER
Ovidio Montero  [address] [place] [id number]      Dear Ovidio,    Welcome to [facility] Complex Care Management Program.  It was a pleasure talking with you today.  My name is Harmony Samaniego, and I look forward to being your Care Manager.  My goal is to help you function at the healthiest and highest level possible.  You can contact me directly at [phone].    As an [facility] patient, some of the services we may be able to provide include:     Development of an individualized care plan with a Registered Nurse   Connection with a   Connection with available resources and services    Coordinate communication among your care team members   Provide coaching and education   Help you understand your doctors treatment plan  Help you obtain information about your insurance coverage.     All services provided by [facility] Complex Care Managers and other care team members are coordinated with and communicated to your primary care team.      As part of your enrollment, you will be receiving education materials and more information about these services in your [facility] account, by phone or through the mail.  If you do not wish to participate or receive information, please contact our office at [phone].      [facility] Patient Rights and Responsibilities available upon request.    Sincerely,      Harmony Samaniego RN  [facility]   Outpatient RN Complex Care Manager  [phone]

## 2025-07-18 NOTE — FIRST PROVIDER EVALUATION
"Medical screening exam completed.  I have conducted a focused provider triage encounter, findings are as follows:    Brief history of present illness:  Here for depression with associated SI. Multiple stressors at home.     Vitals:    07/18/25 1716   BP: (!) 187/99   Pulse: (!) 112   Resp: 20   Temp: 98.3 °F (36.8 °C)   TempSrc: Oral   SpO2: 98%   Weight: (!) 142.9 kg (315 lb)   Height: 5' 11" (1.803 m)       Pertinent physical exam:  awake, alert, ambulatory, NAD, non-labored    Brief workup plan:  psych order set placed    Preliminary workup initiated; this workup will be continued and followed by the physician or advanced practice provider that is assigned to the patient when roomed.   "

## 2025-07-18 NOTE — ED PROVIDER NOTES
Encounter Date: 7/18/2025       History     Chief Complaint   Patient presents with    Suicidal     HPI    29-year-old male with past medical history as noted below, coming in with depressive symptoms over the last 1.5 weeks.   Patient states that he has had a challenging home situation his mom is currently hospitalized with blood clots knee and he is having some guilt around that hospitalization as he feels he could have taken better care of her.   He says that he has had poor sleep quality and staying in bed for long periods decreased energy and has been calling out of his shifts, for last several shift.   He says he has not been cleaning his house in his home is currently in disarray.  He does states that he feels like he does not want to be alive but denies suicidal ideation or plan,  does not want to hurt himself or end his life, no homicidal ideations, no visual auditory hallucinations.  He denies drugs or alcohol. He also says he has not been taking his home medications for blood pressure or diabetes.     He spoke to a  who recommended he come in for in psychiatric evaluation.  He says he would like to speak to a psychiatrist.      Denies any headache, cough, sore throat, fevers, shortness breath, chest pain, abdominal pain, dizziness, weakness or any other physical symptoms.      Does express some future thinking and states that he rather not get committed to a psychiatric hospital as he would like to clean the house prior to his mom returning home.    Review of patient's allergies indicates:   Allergen Reactions    Penicillins      Past Medical History:   Diagnosis Date    Acquired acanthosis nigricans     ADHD (attention deficit hyperactivity disorder)     Dr. Champagne    Anxiety     Diabetes mellitus     HTN (hypertension)     Hyperlipidemia, mixed     Hypertension     Insulin resistance     Morbid obesity     Seasonal allergic rhinitis     Sleep disturbance     Dr. Champagne, counseling    Tic  disorder     Tic disorder 05/03/2012    Vision abnormalities     Myopia & Astigmatism     Past Surgical History:   Procedure Laterality Date    CIRCUMCISION, PRIMARY      LAPAROSCOPIC CHOLECYSTECTOMY N/A 5/12/2025    Procedure: CHOLECYSTECTOMY, LAPAROSCOPIC;  Surgeon: Gustavo Araujo MD;  Location: [facility] OR [address] [place] [address];  Service: General;  Laterality: N/A;     Family History   Problem Relation Name Age of Onset    Obesity Mother Ena     Anxiety disorder Mother Ena     Depression Father      Mental illness Father      Depression Paternal Aunt x1     Mental illness Paternal Aunt x1     Depression Maternal Grandmother      Bipolar disorder Maternal Grandmother      Leukemia Maternal Grandfather          Adult Leukemia - in Remission    Depression Paternal Grandfather      Suicidality Paternal Grandfather      Early death Paternal Grandfather      Cancer Other Mat DZ     Depression Other Mat DZ     Bipolar disorder Other Mat DZ     Mental illness Other Mat DZ     Stroke Other Mat DZ     Hypertension Other Mat DZ     Depression Other Pat DZ     Bipolar disorder Other Pat DZ     Mental illness Other Pat DZ      Social History[1]  Review of Systems    Physical Exam     Initial Vitals [07/18/25 1716]   BP Pulse Resp Temp SpO2   (!) 187/99 (!) 112 20 98.3 °F (36.8 °C) 98 %      MAP       --         Physical Exam    Physical Exam:  CONSTITUTIONAL: Well developed, elevated BMI, in no acute distress.  HENT: Normocephalic, atraumatic    EYES: Sclerae anicteric   NECK: Supple, no thyroid enlargement  CARDIOVASCULAR: Regular rate and rhythm without any murmurs, gallops, rubs.  RESPIRATORY: Speaking in full sentences. Breathing comfortably. Auscultation of the lungs revealed normal breath sounds b/l, no wheezing, no rales, no rhonchi.  ABDOMEN: Soft and nontender, no masses, no rebound or guarding   NEUROLOGIC: Alert, interacting normally. No facial droop. Voice is clear. Speech is fluent.  MSK: Moving all four  extremities.  SKIN: Warm and dry. No visible rash on exposed areas of skin.    Psych: Mood and affect normal.       ED Course   Procedures  Labs Reviewed   COMPREHENSIVE METABOLIC PANEL - Abnormal       Result Value    Sodium 141      Potassium 3.5      Chloride 107      CO2 22 (*)     Glucose 129 (*)     BUN 12      Creatinine 0.8      Calcium 9.1      Protein Total 7.6      Albumin 4.3      Bilirubin Total 0.3      ALP 66      AST 19      ALT 28      Anion Gap 12      eGFR >60     URINALYSIS, REFLEX TO URINE CULTURE - Abnormal    Color, UA Yellow      Appearance, UA Clear      pH, UA 7.0      Spec Grav UA 1.030      Protein, UA 1+ (*)     Glucose, UA Negative      Ketones, UA Negative      Bilirubin, UA Negative      Blood, UA Negative      Nitrites, UA Negative      Urobilinogen, UA Negative      Leukocyte Esterase, UA Negative     ACETAMINOPHEN LEVEL - Abnormal    Acetaminophen Level <3.0 (*)    SALICYLATE LEVEL - Abnormal    Salicylate Level <5.0 (*)    CBC WITH DIFFERENTIAL - Abnormal    WBC 8.16      RBC 4.66      HGB 13.3 (*)     HCT 40.3      MCV 87      MCH 28.5      MCHC 33.0      RDW 13.6      Platelet Count 291      MPV 10.3      Nucleated RBC 0      Neut % 63.6      Lymph % 26.8      Mono % 5.9      Eos % 2.5      Basophil % 0.6      Imm Grans % 0.6 (*)     Neut # 5.19      Lymph # 2.19      Mono # 0.48      Eos # 0.20      Baso # 0.05      Imm Grans # 0.05 (*)    DRUG SCREEN PANEL, URINE EMERGENCY - Normal    Benzodiazepine, Urine Negative      Methadone, Urine Negative      Cocaine, Urine Negative      Opiates, Urine Negative      Barbiturates, Urine Negative      Amphetamines, Urine Negative      THC Negative      Phencyclidine, Urine Negative      Urine Creatinine 203.0      Narrative:     This screen includes the following classes of drugs at the listed cut-off:     Benzodiazepines:        200 ng/ml   Methadone:              300 ng/ml   Cocaine metabolite:     300 ng/ml   Opiates:                 "300 ng/ml   Barbiturates:           200 ng/ml   Amphetamines:           1000 ng/ml   Marijuana metabs (THC): 50 ng/ml   Phencyclidine (PCP):    25 ng/ml     This is a screening test. If results do not correlate with clinical presentation, then a confirmatory send out test is advised.    This report is intended for use in clinical monitoring and management of patients. It is not intended for use in employment related drug testing."   ALCOHOL,MEDICAL (ETHANOL) - Normal    Alcohol, Serum <10     TSH - Normal    TSH 1.585     CBC W/ AUTO DIFFERENTIAL    Narrative:     The following orders were created for panel order CBC auto differential.  Procedure                               Abnormality         Status                     ---------                               -----------         ------                     CBC with Differential[[id number]]       Abnormal            Final result                 Please view results for these tests on the individual orders.   URINALYSIS MICROSCOPIC    RBC, UA 1      WBC, UA <1      Bacteria, UA None      Yeast, UA None      Squamous Epithelial Cells, UA <1      Hyaline Casts, UA 0      Microscopic Comment       GREY TOP URINE HOLD          Imaging Results    None          Medications - No data to display  Medical Decision Making  Amount and/or Complexity of Data Reviewed  Labs: ordered.      29-year-old male with past history as noted coming in with depression, decreased energy, poor sleep and feeling like he would rather not be alive without any active suicidal homicidal ideation, or visual / auditory hallucinations.   Denies drugs or alcohol.    Exam is unremarkable, mood and affect are appropriate, he is forward thinking in the sense that he is thinking about cleaning his apartment prior to his mom's returned from her hospitalization.     He does want to see psychiatry and is looking for help.  He came under his own volition to the emergency department.     Will obtain screening " "labs to look for any metabolic hematologic abnormalities explaining his symptoms, TSH.  Low suspicion for organic cause.      Pec order put in initially from triage, however to me he is not actively suicidal and just depressed, does not want to hurt himself has no plan to hurt himself just feels like he "rather not be alive."  Will hold off PEC for now.  Psychiatric consult.      Disposition for labs and psychiatric team recommendations.    Update:     In the ED he remains well-appearing and hemodynamically stable.    Repeat vitals significantly improved, notably BP.     Labs are unremarkable, glucose mildly elevated.     Seen by psychiatry, discussed with Psychiatry, who agree that currently he is not in need of pec.  Safe discharge home with plan for close psychiatry follow-up as well as EPA resource utilization.      Patient will return to the emergency department if he develops any thoughts of hurting himself, inability of taking care of himself at home, or any other new, worsening worrisome symptoms.      Per discussion with psychiatry will not discharge with anti anxiety in the meds at this time.  He needs to follow-up and have establish care  to be started on medications.      Work note.    Findings of ED work up and return precautions verbally explained to patient. Patient agrees with discharge plan, return instructions and verbalizes understanding of return precautions.                                           Clinical Impression:  Final diagnoses:  [F32.A] Depression, unspecified depression type (Primary)  [I10] Hypertension, unspecified type  [R73.09] Elevated glucose          ED Disposition Condition    Discharge Stable          ED Prescriptions    None       Follow-up Information       Follow up With Specialties Details Why Contact Info    Rufino Maurer MD Internal Medicine In 1 week  [address]  [place] [address]  [phone]      [facility] PSY[facility]IATRY Psychiatry Schedule an appointment " as soon as possible for a visit   Mariela Jacobo  [facility] [address]  [phone]                   [1]   Social History  Tobacco Use    Smoking status: Never     Passive exposure: Yes    Smokeless tobacco: Never   Substance Use Topics    Alcohol use: Never    Drug use: Never        Virgil Mota MD  07/18/25 1174

## 2025-07-18 NOTE — TELEPHONE ENCOUNTER
----- Message from NATALIE Antunez sent at 7/18/2025  3:46 PM CDT -----  Regarding: PHQ Score, SDOH  Good Afternoon:    Please be advised of the following during OPCM Enrollment of [facility]:  Pt verbalized:  --thoughts  of suicide & being better off dead; denied having a plan.  --being depressed, feeling down, & anxious.   --very stressed as of lately, especially due to mother's hospitalization.  --not taking Wellbutrin; had not taking in some time.   --was planning on speaking with PCP @ upcoming appointment regarding medications for anxiety & depression.   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     PHQ was also performed with the following score:  PHQ2:  3 & PHQ9:  18.    Kindly reach out to the patient regarding as well as refer to the PHQ & SDOH assessment performed during today's OPCM encounter.     Thank you,      Antunez C. Aden, RN  [facility]  [email or web address]  [phone]

## 2025-07-18 NOTE — Clinical Note
"Ovidio"Josesito Montero was seen and treated in our emergency department on 7/18/2025.  He may return to work on 07/21/2025.       If you have any questions or concerns, please don't hesitate to call.      Virgil Mota MD"

## 2025-07-19 LAB — HOLD SPECIMEN: NORMAL

## 2025-07-19 NOTE — DISCHARGE INSTRUCTIONS
We would like you to follow up with an outpatient psychiatrist at the [facility] at [phone]. Please call during normal business hours after you are discharged from the ED.  In addition, we recommend you take advantage of the EAP program, which will provide you 5 free psychotherapy counseling sessions.        If you develop any thoughts of hurting yourself or others, inability of taking care of yourself,  hearing voices, seeing things that you think might not be real, or any other new, worsening worrisome symptoms please return to emergency department for re-evaluation.     Please also follow up with your primary care doctor for continued management of your blood pressure, cholesterol and diabetes.

## 2025-07-19 NOTE — CONSULTS
Emergency Psychiatry Consult Note    7/18/2025 7:20 PM  Ovidio Montero Jr.  MRN: [id number]    Chief Complaint / Reason for Consult: depression     SUBJECTIVE     History of Present Illness:   Ovidio Montero Jr. is a 29 y.o. male with a past psychiatric history of reported anxiety and depression, currently presenting with feelings of anxiety & depressed mood. Emergency Psychiatry was originally consulted for depression.    ED staff notes pending at time of consult completion.     Per Psychiatry:  Upon initiation of interview, pt was sitting upright in bed having a friendly conversation with ED staff.  The patient had a scheduled appointment with an [facility]-associated  today and revealed he has felt overwhelmed & anxious recently.  Near the end of June, his mother was admitted to the hospital for DVTs with concern for leg amputation. She was discharged to a physical rehab facility.  The patient lives with his mother, and since she has been gone he has experienced significant guilt, depressed mood, and anxiety regarding her future health. Reports hypersomnia, restlessness, and fatigue during this time as well.     He reports a history of unspecified depression and unspecified depression when he was a teenager. He was placed on wellbutrin and prozac for these conditions, which he does not believe helped.  Since then, he considers himself to have a predominantly bright, non-anxious affect. Patient reports that his mother experienced 2 strokes in the past, which also caused him significant distress. He denies any general impairment, nightmares, or flashbacks from the stressors of his mother's strokes or other traumatic events. Denies any history of psychiatric admissions or outpatient psychiatrist. Denies all substance use as well. Denies any history of jacky described as periods of insomnia, agitation, indiscretion, and hyperactivity.  Denies any history of psychosis including hallucinations  or delusions as well.       Collateral:   Yes - Grandfather, Long Hernandez. [phone]  Long states that he has no acute concerns for suicide completion in the patient.  He tells a similar story to the patient where the mother's stroke in 2018 was a difficult time for the patient. Long denies any psychiatric history of suicide attempts, psychiatric admissions, history of psychosis, or substance use.     Psychiatric Review of Systems:  sleep: yes - excessive sleep  appetite: normal   weight: no  energy/anergy: yes  interest/pleasure/anhedonia: yes  somatic symptoms: no  libido: no  anxiety/panic: yes  guilty/hopelessness: yes  concentration: yes  S.I.B.s/risky behavior: no  Suicidal ideation: no  Homicidal ideation: no  Hallucinations: no  Delusions: no    Medical Review Of Systems:  Brief ROS normal with no physical complaints    Psychiatric History:  Diagnose(s): Yes - Patient reports history of unspecified depression and anxiety as a teenager.  Previous Medication Trials: Yes - wellbutrin and prozac  Previous Psychiatric Hospitalizations:  Denies. None per chart review.  Family Psychiatric History: Denies.  Outpatient Psychiatrist: None.   Outpatient Therapist: None.     Suicide/Violence Risk Assessment:  Current/active suicidal ideation/plan/intent:  Denies.    Previous suicide attempts: No  Current/active homicidal ideation/plan/intent: No  History of threats/arrests associated with violent conduct - No  Access to firearms/lethal weapons - No    Social History:  Marital Status: single  Children: 0   Employment Status: currently employed  Education: college graduate  Special Ed: no  Housing Status: Lives with mother in house  Developmental History: No Cognitive delay/impairment  History of Abuse: No    Substance Abuse History:  Recreational Drugs: Denies. None per chart review.   Use of Alcohol:  Denies.   Rehab History:  Denies.   Tobacco Use:  Denies.   Use of Caffeine: Minimal  Use of OTC:  PRN ibuprofen  (pain) and melatonin (insomnia) use  Is the patient aware of the biomedical complications associated with substance abuse and mental illness? Yes   Legal consequences of chemical use: No    Legal History:  Past Charges/Incarcerations: No  Pending Charges: No    Psychosocial Factors:  Stressors: family.   Functioning Relationships: good support system, gets along well with co-workers, and good relationship with parents    Scheduled Meds:    Psychotherapeutics (From admission, onward)      None            PRN Meds:      Home Meds:  Prior to Admission medications    Medication Sig Start Date End Date Taking? Authorizing Provider   amlodipine-olmesartan (HEMA) 10-40 mg per tablet Take 1 tablet by mouth once daily. 4/28/25 4/28/26  Rufino Maurer MD   atorvastatin (LIPITOR) 20 MG tablet Take 1 tablet (20 mg total) by mouth once daily. 2/25/25 9/9/26  Isela Lui MD   buPROPion (WELLBUTRIN SR) 100 MG TBSR 12 hr tablet Take 1 tablet (100 mg total) by mouth every morning.  Patient not taking: Reported on 7/18/2025 2/25/25 Not taking  Isela Lui MD   ibuprofen (ADVIL,MOTRIN) 800 MG tablet Take 1 tablet (800 mg total) by mouth every 6 (six) hours as needed for Pain. 6/24/24   Lia Sosa NP   melatonin (MELATIN) 5 mg Take by mouth.    Provider, Historical   metFORMIN (GLUCOPHAGE) 500 MG tablet Take 1 tablet (500 mg total) by mouth 2 (two) times daily. 2/25/25   Isela Lui MD   tirzepatide (MOUNJARO) 2.5 mg/0.5 mL PnIj Inject 2.5 mg into the skin every 7 days. 6/9/25 9/7/25  Rufino Maurer MD     Psychotherapeutics (From admission, onward)      None            Allergies:  Penicillins    Past Medical/Surgical History:  Past Medical History:   Diagnosis Date    Acquired acanthosis nigricans     ADHD (attention deficit hyperactivity disorder)     Dr. Champagne    Anxiety     Diabetes mellitus     HTN (hypertension)     Hyperlipidemia, mixed     Hypertension     Insulin resistance     Morbid  "obesity     Seasonal allergic rhinitis     Sleep disturbance     Dr. Champagne, counseling    Tic disorder     Tic disorder 05/03/2012    Vision abnormalities     Myopia & Astigmatism     Past Surgical History:   Procedure Laterality Date    CIRCUMCISION, PRIMARY      LAPAROSCOPIC CHOLECYSTECTOMY N/A 5/12/2025    Procedure: CHOLECYSTECTOMY, LAPAROSCOPIC;  Surgeon: Gustavo Araujo MD;  Location: [facility] OR [address] [place] [address];  Service: General;  Laterality: N/A;       OBJECTIVE     Vital Signs:  Temp:  [98.3 °F (36.8 °C)]   Pulse:  [112]   Resp:  [20]   BP: (187)/(99)   SpO2:  [98 %]     Mental Status Exam:  Appearance: unremarkable, age appropriate, neatly groomed  Level of Consciousness: awake, alert , and oriented  Behavior/Cooperation: friendly and cooperative  Psychomotor: within normal limits   Speech: normal tone, normal rate, normal pitch, normal volume  Language: english, fluent  Orientation: person, place, situation, time/date  Mood: "depressed and anxious"  Affect: euthymic and mood-incongruent  Thought Process: normal and logical, goal-directed  Associations: normal and logical, goal-directed  Thought Content: normal, no suicidality, no homicidality, delusions, or paranoia  Perceptual Disturbances: denies hallucinations  Attention Span/Concentration: intact  Fund of Knowledge: Aware of current events and Intact  Memory: Intact, Remote intact, and Recent intact  Insight: intact, has awareness of illness  Judgment: behavior is adequate to circumstances    Laboratory Data:  Recent Results (from the past 48 hours)   Comprehensive metabolic panel    Collection Time: 07/18/25  6:01 PM   Result Value Ref Range    Sodium 141 136 - 145 mmol/L    Potassium 3.5 3.5 - 5.1 mmol/L    Chloride 107 95 - 110 mmol/L    CO2 22 (L) 23 - 29 mmol/L    Glucose 129 (H) 70 - 110 mg/dL    BUN 12 6 - 20 mg/dL    Creatinine 0.8 0.5 - 1.4 mg/dL    Calcium 9.1 8.7 - 10.5 mg/dL    Protein Total 7.6 6.0 - 8.4 gm/dL    Albumin 4.3 3.5 - 5.2 g/dL    " Bilirubin Total 0.3 0.1 - 1.0 mg/dL    ALP 66 40 - 150 unit/L    AST 19 11 - 45 unit/L    ALT 28 10 - 44 unit/L    Anion Gap 12 8 - 16 mmol/L    eGFR >60 >60 mL/min/1.73/m2   Urinalysis, Reflex to Urine Culture Urine, Clean Catch    Collection Time: 07/18/25  6:01 PM    Specimen: Urine   Result Value Ref Range    Color, UA Yellow Straw, Sary, Yellow, Light-Orange    Appearance, UA Clear Clear    pH, UA 7.0 5.0 - 8.0    Spec Grav UA 1.030 1.005 - 1.030    Protein, UA 1+ (A) Negative    Glucose, UA Negative Negative    Ketones, UA Negative Negative    Bilirubin, UA Negative Negative    Blood, UA Negative Negative    Nitrites, UA Negative Negative    Urobilinogen, UA Negative <2.0 EU/dL    Leukocyte Esterase, UA Negative Negative   Drug screen panel, emergency    Collection Time: 07/18/25  6:01 PM   Result Value Ref Range    Benzodiazepine, Urine Negative Negative    Methadone, Urine Negative Negative    Cocaine, Urine Negative Negative    Opiates, Urine Negative Negative    Barbiturates, Urine Negative Negative    Amphetamines, Urine Negative Negative    THC Negative Negative    Phencyclidine, Urine Negative Negative    Urine Creatinine 203.0 23.0 - 375.0 mg/dL   Ethanol    Collection Time: 07/18/25  6:01 PM   Result Value Ref Range    Alcohol, Serum <10 <10 mg/dL   Acetaminophen level    Collection Time: 07/18/25  6:01 PM   Result Value Ref Range    Acetaminophen Level <3.0 (L) 10.0 - 20.0 ug/ml   Salicylate level    Collection Time: 07/18/25  6:01 PM   Result Value Ref Range    Salicylate Level <5.0 (L) 15.0 - 30.0 mg/dL   CBC with Differential    Collection Time: 07/18/25  6:01 PM   Result Value Ref Range    WBC 8.16 3.90 - 12.70 K/uL    RBC 4.66 4.60 - 6.20 M/uL    HGB 13.3 (L) 14.0 - 18.0 gm/dL    HCT 40.3 40.0 - 54.0 %    MCV 87 82 - 98 fL    MCH 28.5 27.0 - 31.0 pg    MCHC 33.0 32.0 - 36.0 g/dL    RDW 13.6 11.5 - 14.5 %    Platelet Count 291 150 - 450 K/uL    MPV 10.3 9.2 - 12.9 fL    Nucleated RBC 0 <=0 /100  "WBC    Neut % 63.6 38 - 73 %    Lymph % 26.8 18 - 48 %    Mono % 5.9 4 - 15 %    Eos % 2.5 <=8 %    Basophil % 0.6 <=1.9 %    Imm Grans % 0.6 (H) 0.0 - 0.5 %    Neut # 5.19 1.8 - 7.7 K/uL    Lymph # 2.19 1 - 4.8 K/uL    Mono # 0.48 0.3 - 1 K/uL    Eos # 0.20 <=0.5 K/uL    Baso # 0.05 <=0.2 K/uL    Imm Grans # 0.05 (H) 0.00 - 0.04 K/uL   TSH    Collection Time: 07/18/25  6:01 PM   Result Value Ref Range    TSH 1.585 0.400 - 4.000 uIU/mL   Urinalysis Microscopic    Collection Time: 07/18/25  6:01 PM   Result Value Ref Range    RBC, UA 1 0 - 4 /HPF    WBC, UA <1 0 - 5 /HPF    Bacteria, UA None None, Rare, Occasional /HPF    Yeast, UA None None /HPF    Squamous Epithelial Cells, UA <1 <=5 /HPF    Hyaline Casts, UA 0 0 - 1 /LPF    Microscopic Comment        No results found for: "PHENYTOIN", "PHENOBARB", "VALPROATE", "CBMZ"    Imaging:  Imaging Results    None          ASSESSMENT     Ovidio Montero Jr. is a 29 y.o. male with a past psychiatric history of unspecified anxiety and unspecified depression, currently presenting with anxiety and depressed mood.  Emergency Psychiatry was originally consulted for depression. The patient's prolonged depressive symptoms of depressed mood, anhedonia, guilt, fatigue, and a lack of concentration suggest that a MDD diagnosis is warranted.  He does not meet criteria for an acute stress disorder, and most notably does not exhibit the level of impairment or common dissociation that is more indicative for this disorder.  The patient has multiple protective factors that suggest against suicide completion including: no current SI, never having a plan, no history of suicide attempts, strong social support system, and professional support.  Therefore, I do not believe that an inpatient psychiatric admission is warranted.  He is amenable for outpatient care.  We recommend for patient to call the outpatient psychiatry office at [facility] ([phone]) since this will also be most " convenient for him.  Instructions and phone number to call will be listed in AVS.   The patient would also benefit from the 5 free counseling services provided by the [facility] program. We will defer on starting any medications to future provider.       IMPRESSION  Major depressive disorder, moderate, with anxious distress    RECOMMENDATION(S)      1. Scheduled Medication(s):  None    2. PRN Medication(s):  None    3. Legal Status/Precaution(s):  Patient does not meet criteria for PEC or inpatient psychiatric admission at this time. Recommend to rescind PEC if one was placed. Patient is not currently an imminent danger to self or others and is not gravely disabled due to a psychiatric illness. Patient is cleared from a psychiatric standpoint and can be discharged to home/self-care; will sign off. Please provide a resource sheet if needed.    Precautions: None    In cases of emergency, daily coverage provided by Acute/ED Psych MD, NP, or SW, with associated contact numbers listed in the [facility] On Call Schedule.    Case discussed with emergency psychiatry staff: Dr. Clara Weldon MD

## 2025-07-21 ENCOUNTER — TELEPHONE (OUTPATIENT)
Dept: INTERNAL MEDICINE | Facility: CLINIC | Age: 30
End: 2025-07-21
Payer: COMMERCIAL

## 2025-07-21 NOTE — TELEPHONE ENCOUNTER
----- Message from NATALIE Antunez sent at 7/18/2025  3:46 PM CDT -----  Regarding: PHQ Score, SDOH  Good Afternoon:    Please be advised of the following during OPCM Enrollment of Ovidio:  Pt verbalized:  --thoughts  of suicide & being better off dead; denied having a plan.  --being depressed, feeling down, & anxious.   --very stressed as of lately, especially due to mother's hospitalization.  --not taking Wellbutrin; had not taking in some time.   --was planning on speaking with PCP @ upcoming appointment regarding medications for anxiety & depression.   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     PHQ was also performed with the following score:  PHQ2:  3 & PHQ9:  18.    Kindly reach out to the patient regarding as well as refer to the PHQ & SDOH assessment performed during today's OPCM encounter.     Thank you,      Antunez C. Aden, RN  [facility]  [email or web address]  [phone]
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
## 2025-07-22 ENCOUNTER — OUTPATIENT CASE MANAGEMENT (OUTPATIENT)
Dept: ADMINISTRATIVE | Facility: OTHER | Age: 30
End: 2025-07-22
Payer: COMMERCIAL

## 2025-07-22 NOTE — PROGRESS NOTES
Outpatient Care Management   - Patient Assessment    Patient: Ovidio Montero Jr.  MRN:  [id number]  Date of Service:  7/22/2025  Completed by:  Jaziel Estevez LCSW  Referral Date: 06/21/2025    Reason for Visit   Patient presents with    Social Work Assessment     07/22/2025        Brief Summary:  received a referral from OPCM RN for the following HR SW psychosocial needs mental health.  The patient also requests assistance with FMLA. Care plan was created in collaboration with patient/caregiver input.

## 2025-07-23 ENCOUNTER — OUTPATIENT CASE MANAGEMENT (OUTPATIENT)
Dept: ADMINISTRATIVE | Facility: OTHER | Age: 30
End: 2025-07-23
Payer: COMMERCIAL

## 2025-07-24 NOTE — PROGRESS NOTES
Outpatient Care Management  Plan of Care Follow Up Visit    Patient: Ovidio Montero Jr.  MRN: [id number]  Date of Service: 07/23/2025  Completed by: Harmony Samaniego RN  Referral Date: 06/21/2025    Reason for Visit   Patient presents with    OPCM RN Follow Up Call    Other       Brief Summary:   OPCM RN follow-up call completed.   Reviewed and education provided on:      Depression/Anxiety & HTN Mgmt    Next Steps:   Ovidio agrees to follow up call in 2 weeks on or around 8/5. Will discuss:   Success of receiving sooner PCP f/u appt &/or wait list placement.  Determination if Psych referral placed for med mgmt.   Assess depression, anxiety, SI.   Perform PHQ  Assess home BP monitoring.   Success in enrolling in [facility] & receipt of equipment.   Risk factors of HTN.

## 2025-07-25 ENCOUNTER — TELEPHONE (OUTPATIENT)
Dept: INTERNAL MEDICINE | Facility: CLINIC | Age: 30
End: 2025-07-25
Payer: COMMERCIAL

## 2025-07-29 ENCOUNTER — PATIENT MESSAGE (OUTPATIENT)
Dept: ADMINISTRATIVE | Facility: OTHER | Age: 30
End: 2025-07-29
Payer: COMMERCIAL

## 2025-07-29 ENCOUNTER — OUTPATIENT CASE MANAGEMENT (OUTPATIENT)
Dept: ADMINISTRATIVE | Facility: OTHER | Age: 30
End: 2025-07-29
Payer: COMMERCIAL

## 2025-08-04 ENCOUNTER — PATIENT MESSAGE (OUTPATIENT)
Dept: ADMINISTRATIVE | Facility: OTHER | Age: 30
End: 2025-08-04
Payer: COMMERCIAL

## 2025-08-06 ENCOUNTER — OUTPATIENT CASE MANAGEMENT (OUTPATIENT)
Dept: ADMINISTRATIVE | Facility: OTHER | Age: 30
End: 2025-08-06
Payer: COMMERCIAL

## 2025-08-07 NOTE — PROGRESS NOTES
Outpatient Care Management  Plan of Care Follow Up Visit    Patient: Ovidio Montero Jr.  MRN: [id number]  Date of Service: 08/06/2025  Completed by: Harmony Samaniego RN  Referral Date: 06/21/2025    Reason for Visit   Patient presents with    OPCM RN Follow Up Call    Other       Brief Summary:   OPCM RN follow-up call completed.   Reviewed and education provided on: HTN & Depression  Overview & Mgnt    Next Steps:   Ovidio agrees to follow up call in 3 weeks on or around 8/27. Will discuss:   Benefits of Counseling.   Discuss outcome of appt w/ Jaziel Hernandez LCSW.  Discuss need for Rx med for depression and/or anxiety  Risk factors of HTN   When to seek medical attention for unresolved/worsening symptoms of HTN

## 2025-08-25 ENCOUNTER — PATIENT MESSAGE (OUTPATIENT)
Dept: ADMINISTRATIVE | Facility: OTHER | Age: 30
End: 2025-08-25
Payer: COMMERCIAL

## 2025-08-26 ENCOUNTER — OUTPATIENT CASE MANAGEMENT (OUTPATIENT)
Dept: ADMINISTRATIVE | Facility: OTHER | Age: 30
End: 2025-08-26
Payer: COMMERCIAL

## (undated) DEVICE — DRAPE ABDOMINAL TIBURON 14X11

## (undated) DEVICE — COVER TIP CURVED SCISSORS XI

## (undated) DEVICE — NDL HYPO REG 25G X 1 1/2

## (undated) DEVICE — CLIP HEMO-LOK ML

## (undated) DEVICE — SOL NACL 0.9% IV INJ 1000ML

## (undated) DEVICE — SYS SEE SHARP SCP ANTIFG LNG

## (undated) DEVICE — DRAPE STERI INSTRUMENT 1018

## (undated) DEVICE — KIT ANTIFOG W/SPONG & FLUID

## (undated) DEVICE — TRAY MINOR GEN SURG OMC

## (undated) DEVICE — SYR 10CC LUER LOCK

## (undated) DEVICE — TROCAR SPACEMAKER BLUNT 10MM

## (undated) DEVICE — SEAL UNIVERSAL 5MM-8MM XI

## (undated) DEVICE — IRRIGATOR ENDOSCOPY DISP.

## (undated) DEVICE — ELECTRODE BLADE INSULATED 1 IN

## (undated) DEVICE — BLADE SURG CARBON STEEL SZ11

## (undated) DEVICE — GOWN SMART IMP BREATHABLE XXLG

## (undated) DEVICE — ADHESIVE DERMABOND ADVANCED

## (undated) DEVICE — PAD PINK TRENDELENBURG POS XL

## (undated) DEVICE — SOL ELECTROLUBE ANTI-STIC

## (undated) DEVICE — DRAPE ARM DAVINCI XI

## (undated) DEVICE — DRAPE COLUMN DAVINCI XI

## (undated) DEVICE — SUT MCRYL PLUS 4-0 PS2 27IN

## (undated) DEVICE — IRRIGATOR ENDOWRIST XI SUCTION

## (undated) DEVICE — GLOVE GAMMEX SURG LF PI SZ 7.5

## (undated) DEVICE — SCISSOR 5MMX35CM DIRECT DRIVE

## (undated) DEVICE — ELECTRODE MEGADYNE RETURN DUAL

## (undated) DEVICE — OBTURATOR BLADELESS 8MM XI

## (undated) DEVICE — DRAPE SCOPE PILLOW WARMER

## (undated) DEVICE — BAG TISS RETRV MONARCH 10MM

## (undated) DEVICE — TROCAR ENDOPATH XCEL 5X75MM

## (undated) DEVICE — ELECTRODE REM PLYHSV RETURN 9

## (undated) DEVICE — TOWEL OR DISP STRL BLUE 4/PK

## (undated) DEVICE — PENCIL ROCKER SWITCH 10FT CORD

## (undated) DEVICE — TUBING HF INSUFFLATION W/ FLTR

## (undated) DEVICE — DRAPE CORETEMP FLD WRM 56X62IN

## (undated) DEVICE — TUBE SET SINGLE LUMEN FILTERED

## (undated) DEVICE — SUT VICRYL+ 27 UR-6 VIOL

## (undated) DEVICE — TROCAR ENDOPATH XCEL 5X100MM

## (undated) DEVICE — TROCAR ENDOPATH XCEL 5MM 7.5CM